# Patient Record
Sex: FEMALE | Race: WHITE | NOT HISPANIC OR LATINO | Employment: OTHER | URBAN - METROPOLITAN AREA
[De-identification: names, ages, dates, MRNs, and addresses within clinical notes are randomized per-mention and may not be internally consistent; named-entity substitution may affect disease eponyms.]

---

## 2023-04-19 ENCOUNTER — OFFICE VISIT (OUTPATIENT)
Dept: PRIMARY CARE | Facility: CLINIC | Age: 68
End: 2023-04-19
Payer: MEDICARE

## 2023-04-19 VITALS
BODY MASS INDEX: 36.54 KG/M2 | HEIGHT: 64 IN | DIASTOLIC BLOOD PRESSURE: 80 MMHG | SYSTOLIC BLOOD PRESSURE: 162 MMHG | WEIGHT: 214 LBS

## 2023-04-19 DIAGNOSIS — R03.0 ELEVATED BLOOD PRESSURE READING IN OFFICE WITHOUT DIAGNOSIS OF HYPERTENSION: ICD-10-CM

## 2023-04-19 DIAGNOSIS — E78.49 OTHER HYPERLIPIDEMIA: Primary | ICD-10-CM

## 2023-04-19 DIAGNOSIS — K21.9 GASTROESOPHAGEAL REFLUX DISEASE WITHOUT ESOPHAGITIS: ICD-10-CM

## 2023-04-19 DIAGNOSIS — R06.09 DOE (DYSPNEA ON EXERTION): ICD-10-CM

## 2023-04-19 PROBLEM — F32.A ANXIETY AND DEPRESSION: Status: ACTIVE | Noted: 2023-04-19

## 2023-04-19 PROBLEM — F41.9 ANXIETY AND DEPRESSION: Status: ACTIVE | Noted: 2023-04-19

## 2023-04-19 PROBLEM — M19.90 ARTHRITIS: Status: ACTIVE | Noted: 2023-04-19

## 2023-04-19 PROCEDURE — 1159F MED LIST DOCD IN RCRD: CPT | Performed by: INTERNAL MEDICINE

## 2023-04-19 PROCEDURE — 99204 OFFICE O/P NEW MOD 45 MIN: CPT | Performed by: INTERNAL MEDICINE

## 2023-04-19 PROCEDURE — 1036F TOBACCO NON-USER: CPT | Performed by: INTERNAL MEDICINE

## 2023-04-19 RX ORDER — HYDROXYZINE PAMOATE 25 MG/1
25 CAPSULE ORAL 3 TIMES DAILY PRN
COMMUNITY
Start: 2023-04-03 | End: 2023-07-07 | Stop reason: ALTCHOICE

## 2023-04-19 RX ORDER — ALBUTEROL SULFATE 90 UG/1
AEROSOL, METERED RESPIRATORY (INHALATION)
COMMUNITY
Start: 2023-01-30

## 2023-04-19 RX ORDER — PANTOPRAZOLE SODIUM 40 MG/1
40 TABLET, DELAYED RELEASE ORAL
Qty: 90 TABLET | Refills: 1 | Status: SHIPPED | OUTPATIENT
Start: 2023-04-19 | End: 2023-05-17 | Stop reason: SDUPTHER

## 2023-04-19 RX ORDER — BUPROPION HCL 300 MG
300 TABLET, EXTENDED RELEASE 24 HR ORAL EVERY 24 HOURS
COMMUNITY

## 2023-04-19 RX ORDER — HYDROXYCHLOROQUINE SULFATE 200 MG/1
200 TABLET, FILM COATED ORAL DAILY
COMMUNITY
Start: 2023-02-15 | End: 2024-03-11 | Stop reason: WASHOUT

## 2023-04-19 RX ORDER — MIRTAZAPINE 15 MG/1
15 TABLET, FILM COATED ORAL NIGHTLY
COMMUNITY
Start: 2023-03-30

## 2023-04-19 RX ORDER — SIMVASTATIN 20 MG/1
20 TABLET, FILM COATED ORAL NIGHTLY
Qty: 90 TABLET | Refills: 1 | Status: SHIPPED | OUTPATIENT
Start: 2023-04-19 | End: 2023-07-07 | Stop reason: SDUPTHER

## 2023-04-19 RX ORDER — PANTOPRAZOLE SODIUM 40 MG/1
40 TABLET, DELAYED RELEASE ORAL
COMMUNITY
End: 2023-04-19 | Stop reason: SDUPTHER

## 2023-04-19 RX ORDER — SIMVASTATIN 20 MG/1
20 TABLET, FILM COATED ORAL NIGHTLY
COMMUNITY
End: 2023-04-19 | Stop reason: SDUPTHER

## 2023-04-19 NOTE — PROGRESS NOTES
"Subjective   Patient ID: Johanny Rosales is a 67 y.o. female who presents for New Patient Visit.    HPI   To est pcp  Patient recently had physical done by the nurse practitioner and had blood work done  She feels very tired and gets short of breath easily  She has a lot of anxiety issues and she is under care of [facility] with new psychiatrist  She used to be on Klonopin which she is not prescribed anymore as it is worked the best for her  She does drink alcohol and moderately high amount.  She was with AA but could not quit drinking  Complaining of numbness in both hands  Needs medication refill  She is on disability for depression and anxiety since 2007  Her MRI of C-spine shows arthritis and C3-4 and mild canal stenosis at C5-6    PMH: anxiety, depression, HLD, urinary incontinence, psoriatic arthritis, osteoarthritis, carpal tunnel syndrome  PSH: cholecystectomy (2017), R shoulder tendon repair (2018)  FH: Mom - dementia, Father - CVA, Sister - breast CA  SocHx: Drinks 2L of wine daily. Denies tobacco use, quit smoking 8 yr, illicit drug use  Allergies: NKDA  Patient has  Voice and 5 grandkids  Review of Systems    Objective   /80   Ht 1.626 m (5' 4\")   Wt 97.1 kg (214 lb)   BMI 36.73 kg/m²     Physical Exam  Vitals reviewed.   Constitutional:       Appearance: Normal appearance.   HENT:      Head: Normocephalic and atraumatic.      Right Ear: Tympanic membrane, ear canal and external ear normal.      Left Ear: Tympanic membrane, ear canal and external ear normal.      Nose: Nose normal.      Mouth/Throat:      Pharynx: Oropharynx is clear.   Eyes:      Extraocular Movements: Extraocular movements intact.      Conjunctiva/sclera: Conjunctivae normal.      Pupils: Pupils are equal, round, and reactive to light.   Cardiovascular:      Rate and Rhythm: Normal rate and regular rhythm.      Pulses: Normal pulses.      Heart sounds: Normal heart sounds.   Pulmonary:      Effort: Pulmonary effort is " normal.      Breath sounds: Normal breath sounds.   Abdominal:      General: Abdomen is flat. Bowel sounds are normal.      Palpations: Abdomen is soft.   Musculoskeletal:      Cervical back: Normal range of motion and neck supple.   Skin:     General: Skin is warm and dry.   Neurological:      General: No focal deficit present.      Mental Status: She is alert and oriented to person, place, and time.   Psychiatric:         Mood and Affect: Mood normal.         Assessment/Plan   Problem List Items Addressed This Visit          Digestive    Gastroesophageal reflux disease without esophagitis    Relevant Medications    Protonix 40 mg EC tablet       Other    Other hyperlipidemia - Primary    Relevant Medications    Zocor 20 mg tablet     Other Visit Diagnoses       SPENCER (dyspnea on exertion)        Relevant Orders    Transthoracic echo (TTE) complete          Patient's previous hospital chart reviewed  Recent blood work from [facility] TSH 2.76, A1c 4.6 hemoglobin 13.5 hematocrit 46.4 WBC 5.9 vitamin D 63 AST 72 ALT 68 alk phos 139 BUN 9 creatinine 1.7.  Total cholesterol 266 triglycerides 95   Patient seems to be stable on current medication  Refill Protonix and Zocor  Her blood pressure is running little high not sure if it is just the first visit causing it  We will check 2D echo with her symptoms of dyspnea  Will monitor blood work and if it is high will start medication

## 2023-05-04 ENCOUNTER — OFFICE VISIT (OUTPATIENT)
Dept: PRIMARY CARE | Facility: CLINIC | Age: 68
End: 2023-05-04
Payer: MEDICARE

## 2023-05-04 VITALS
BODY MASS INDEX: 36.19 KG/M2 | SYSTOLIC BLOOD PRESSURE: 124 MMHG | HEIGHT: 64 IN | WEIGHT: 212 LBS | DIASTOLIC BLOOD PRESSURE: 70 MMHG

## 2023-05-04 DIAGNOSIS — R35.0 URINARY FREQUENCY: ICD-10-CM

## 2023-05-04 DIAGNOSIS — R10.9 ABDOMINAL PAIN, UNSPECIFIED ABDOMINAL LOCATION: Primary | ICD-10-CM

## 2023-05-04 PROBLEM — F14.21 COCAINE USE DISORDER, SEVERE, IN SUSTAINED REMISSION (MULTI): Chronic | Status: ACTIVE | Noted: 2023-01-27

## 2023-05-04 PROBLEM — F33.1 MODERATE EPISODE OF RECURRENT MAJOR DEPRESSIVE DISORDER (MULTI): Chronic | Status: ACTIVE | Noted: 2023-01-27

## 2023-05-04 PROCEDURE — 99213 OFFICE O/P EST LOW 20 MIN: CPT | Performed by: INTERNAL MEDICINE

## 2023-05-04 PROCEDURE — 1159F MED LIST DOCD IN RCRD: CPT | Performed by: INTERNAL MEDICINE

## 2023-05-04 PROCEDURE — 1036F TOBACCO NON-USER: CPT | Performed by: INTERNAL MEDICINE

## 2023-05-04 RX ORDER — TRAZODONE HYDROCHLORIDE 100 MG/1
2 TABLET ORAL NIGHTLY
COMMUNITY
Start: 2011-06-11 | End: 2024-03-11 | Stop reason: WASHOUT

## 2023-05-04 RX ORDER — HYDROXYZINE HYDROCHLORIDE 25 MG/1
25 TABLET, FILM COATED ORAL EVERY 4 HOURS PRN
COMMUNITY
Start: 2011-06-11

## 2023-05-04 RX ORDER — PRAVASTATIN SODIUM 20 MG/1
1 TABLET ORAL NIGHTLY
COMMUNITY
Start: 2011-06-11 | End: 2023-07-07 | Stop reason: ALTCHOICE

## 2023-05-04 RX ORDER — BUSPIRONE HYDROCHLORIDE 15 MG/1
30 TABLET ORAL 2 TIMES DAILY
COMMUNITY
Start: 2011-06-11 | End: 2024-03-11 | Stop reason: WASHOUT

## 2023-05-04 RX ORDER — SERTRALINE HYDROCHLORIDE 100 MG/1
100 TABLET, FILM COATED ORAL 2 TIMES DAILY
COMMUNITY
Start: 2011-06-11

## 2023-05-06 ASSESSMENT — ENCOUNTER SYMPTOMS
ABDOMINAL PAIN: 1
FREQUENCY: 1

## 2023-05-07 NOTE — PROGRESS NOTES
"Subjective   Patient ID: Johanny Rosales is a 67 y.o. female who presents for Urinary Frequency and Abdominal Pain.    Urinary Frequency   Associated symptoms include frequency.   Abdominal Pain  Associated symptoms include frequency.      Patient is here with complaints of having urinary frequency  Lower abdominal pain   Going for carpal tunnel surgery on Tuesday    to [facility] pcp  Patient recently had physical done by the nurse practitioner and had blood work done  She feels very tired and gets short of breath easily  She has a lot of anxiety issues and she is under care of [facility] with new psychiatrist  She used to be on Klonopin which she is not prescribed anymore as it is worked the best for her  She does drink alcohol and moderately high amount.  She was with AA but could not quit drinking  Complaining of numbness in both hands  Needs medication refill  She is on disability for depression and anxiety since 2007  Her MRI of C-spine shows arthritis and C3-4 and mild canal stenosis at C5-6     PMH: anxiety, depression, HLD, urinary incontinence, psoriatic arthritis, osteoarthritis, carpal tunnel syndrome  PSH: cholecystectomy (2017), R shoulder tendon repair (2018)  FH: Mom - dementia, Father - CVA, Sister - breast CA  SocHx: Drinks 2L of wine daily. Denies tobacco use, quit smoking 8 yr, illicit drug use  Allergies: NKDA    Review of Systems   Gastrointestinal:  Positive for abdominal pain.   Genitourinary:  Positive for frequency.       Objective   /70   Ht 1.626 m (5' 4\")   Wt 96.2 kg (212 lb)   BMI 36.39 kg/m²     Physical Exam  Vitals reviewed.   Constitutional:       Appearance: Normal appearance.   HENT:      Head: Normocephalic and atraumatic.      Right Ear: Tympanic membrane, ear canal and external ear normal.      Left Ear: Tympanic membrane, ear canal and external ear normal.      Nose: Nose normal.      Mouth/Throat:      Pharynx: Oropharynx is clear.   Eyes:      Extraocular Movements: " Extraocular movements intact.      Conjunctiva/sclera: Conjunctivae normal.      Pupils: Pupils are equal, round, and reactive to light.   Cardiovascular:      Rate and Rhythm: Normal rate and regular rhythm.      Pulses: Normal pulses.      Heart sounds: Normal heart sounds.   Pulmonary:      Effort: Pulmonary effort is normal.      Breath sounds: Normal breath sounds.   Abdominal:      General: Abdomen is flat. Bowel sounds are normal.      Palpations: Abdomen is soft.   Musculoskeletal:      Cervical back: Normal range of motion and neck supple.   Skin:     General: Skin is warm and dry.   Neurological:      General: No focal deficit present.      Mental Status: She is alert and oriented to person, place, and time.   Psychiatric:         Mood and Affect: Mood normal.         Assessment/Plan   Problem List Items Addressed This Visit    None  Visit Diagnoses       Urinary frequency        Relevant Orders    Urinalysis with Reflex Microscopic    Urine Culture             Past recap  Patient's previous hospital chart reviewed  Recent blood work from [place] reviewed TSH 2.76, A1c 4.6 hemoglobin 13.5 hematocrit 46.4 WBC 5.9 vitamin D 63 AST 72 ALT 68 alk phos 139 BUN 9 creatinine 1.7.  Total cholesterol 266 triglycerides 95   Patient seems to be stable on current medication  Refill Protonix and Zocor  Her blood pressure is running little high not sure if it is just the first visit causing it  We will check 2D echo with her symptoms of dyspnea  Will monitor blood work and if it is high will start medication    5/4/2023  We will check urine and culture  We will do blood work in 1 month CBC CMP to monitor kidney function and elevated liver enzyme  Cut down salt cut down carbs

## 2023-05-17 DIAGNOSIS — K21.9 GASTROESOPHAGEAL REFLUX DISEASE WITHOUT ESOPHAGITIS: ICD-10-CM

## 2023-05-17 RX ORDER — PANTOPRAZOLE SODIUM 40 MG/1
40 TABLET, DELAYED RELEASE ORAL
Qty: 90 TABLET | Refills: 1 | Status: SHIPPED | OUTPATIENT
Start: 2023-05-17 | End: 2023-07-07 | Stop reason: SDUPTHER

## 2023-05-22 ENCOUNTER — LAB (OUTPATIENT)
Dept: LAB | Facility: LAB | Age: 68
End: 2023-05-22
Payer: MEDICARE

## 2023-05-22 DIAGNOSIS — R35.0 URINARY FREQUENCY: ICD-10-CM

## 2023-05-22 LAB
APPEARANCE, URINE: CLEAR
BILIRUBIN, URINE: NEGATIVE
BLOOD, URINE: NEGATIVE
COLOR, URINE: NORMAL
GLUCOSE, URINE: NEGATIVE MG/DL
KETONES, URINE: NEGATIVE MG/DL
LEUKOCYTE ESTERASE, URINE: NEGATIVE
NITRITE, URINE: NEGATIVE
PH, URINE: 5 (ref 5–8)
PROTEIN, URINE: NEGATIVE MG/DL
SPECIFIC GRAVITY, URINE: 1 (ref 1–1.03)
UROBILINOGEN, URINE: <2 MG/DL (ref 0–1.9)

## 2023-05-22 PROCEDURE — 81003 URINALYSIS AUTO W/O SCOPE: CPT

## 2023-06-05 ENCOUNTER — APPOINTMENT (OUTPATIENT)
Dept: PRIMARY CARE | Facility: CLINIC | Age: 68
End: 2023-06-05
Payer: MEDICARE

## 2023-06-06 ENCOUNTER — APPOINTMENT (OUTPATIENT)
Dept: PRIMARY CARE | Facility: CLINIC | Age: 68
End: 2023-06-06
Payer: MEDICARE

## 2023-06-13 ENCOUNTER — APPOINTMENT (OUTPATIENT)
Dept: PRIMARY CARE | Facility: CLINIC | Age: 68
End: 2023-06-13
Payer: MEDICARE

## 2023-06-21 ENCOUNTER — APPOINTMENT (OUTPATIENT)
Dept: PRIMARY CARE | Facility: CLINIC | Age: 68
End: 2023-06-21
Payer: MEDICARE

## 2023-07-07 ENCOUNTER — OFFICE VISIT (OUTPATIENT)
Dept: PRIMARY CARE | Facility: CLINIC | Age: 68
End: 2023-07-07
Payer: MEDICARE

## 2023-07-07 VITALS
DIASTOLIC BLOOD PRESSURE: 64 MMHG | WEIGHT: 215 LBS | BODY MASS INDEX: 36.7 KG/M2 | HEIGHT: 64 IN | SYSTOLIC BLOOD PRESSURE: 120 MMHG

## 2023-07-07 DIAGNOSIS — F32.A ANXIETY AND DEPRESSION: ICD-10-CM

## 2023-07-07 DIAGNOSIS — E78.49 OTHER HYPERLIPIDEMIA: ICD-10-CM

## 2023-07-07 DIAGNOSIS — K21.9 GASTROESOPHAGEAL REFLUX DISEASE WITHOUT ESOPHAGITIS: ICD-10-CM

## 2023-07-07 DIAGNOSIS — F41.9 ANXIETY AND DEPRESSION: ICD-10-CM

## 2023-07-07 PROCEDURE — 1159F MED LIST DOCD IN RCRD: CPT | Performed by: INTERNAL MEDICINE

## 2023-07-07 PROCEDURE — 1036F TOBACCO NON-USER: CPT | Performed by: INTERNAL MEDICINE

## 2023-07-07 PROCEDURE — 99213 OFFICE O/P EST LOW 20 MIN: CPT | Performed by: INTERNAL MEDICINE

## 2023-07-07 RX ORDER — GABAPENTIN 100 MG/1
100 CAPSULE ORAL 3 TIMES DAILY
COMMUNITY
Start: 2023-06-29

## 2023-07-07 RX ORDER — SIMVASTATIN 20 MG/1
20 TABLET, FILM COATED ORAL NIGHTLY
Qty: 90 TABLET | Refills: 1 | Status: SHIPPED | OUTPATIENT
Start: 2023-07-07 | End: 2023-10-12 | Stop reason: SDUPTHER

## 2023-07-07 RX ORDER — PANTOPRAZOLE SODIUM 40 MG/1
40 TABLET, DELAYED RELEASE ORAL 2 TIMES DAILY
Qty: 180 TABLET | Refills: 1 | Status: SHIPPED | OUTPATIENT
Start: 2023-07-07 | End: 2023-10-12 | Stop reason: SDUPTHER

## 2023-07-13 ENCOUNTER — LAB (OUTPATIENT)
Dept: LAB | Facility: LAB | Age: 68
End: 2023-07-13
Payer: MEDICARE

## 2023-07-13 ENCOUNTER — HOSPITAL ENCOUNTER (OUTPATIENT)
Dept: DATA CONVERSION | Facility: HOSPITAL | Age: 68
Discharge: HOME | End: 2023-07-13

## 2023-07-13 DIAGNOSIS — E78.49 OTHER HYPERLIPIDEMIA: ICD-10-CM

## 2023-07-13 DIAGNOSIS — K21.9 GASTROESOPHAGEAL REFLUX DISEASE WITHOUT ESOPHAGITIS: ICD-10-CM

## 2023-07-13 DIAGNOSIS — F41.9 ANXIETY AND DEPRESSION: ICD-10-CM

## 2023-07-13 DIAGNOSIS — F32.A ANXIETY AND DEPRESSION: ICD-10-CM

## 2023-07-13 LAB
ALANINE AMINOTRANSFERASE (SGPT) (U/L) IN SER/PLAS: 73 U/L (ref 7–45)
ALBUMIN (G/DL) IN SER/PLAS: 4.9 G/DL (ref 3.4–5)
ALKALINE PHOSPHATASE (U/L) IN SER/PLAS: 111 U/L (ref 33–136)
ANION GAP IN SER/PLAS: 17 MMOL/L (ref 10–20)
ASPARTATE AMINOTRANSFERASE (SGOT) (U/L) IN SER/PLAS: 66 U/L (ref 9–39)
BILIRUBIN TOTAL (MG/DL) IN SER/PLAS: 0.7 MG/DL (ref 0–1.2)
CALCIUM (MG/DL) IN SER/PLAS: 9.9 MG/DL (ref 8.6–10.6)
CARBON DIOXIDE, TOTAL (MMOL/L) IN SER/PLAS: 29 MMOL/L (ref 21–32)
CHLORIDE (MMOL/L) IN SER/PLAS: 99 MMOL/L (ref 98–107)
CHOLESTEROL (MG/DL) IN SER/PLAS: 265 MG/DL (ref 0–199)
CHOLESTEROL IN HDL (MG/DL) IN SER/PLAS: 133.9 MG/DL
CHOLESTEROL/HDL RATIO: 2
CREATININE (MG/DL) IN SER/PLAS: 0.63 MG/DL (ref 0.5–1.05)
ERYTHROCYTE DISTRIBUTION WIDTH (RATIO) BY AUTOMATED COUNT: 12.6 % (ref 11.5–14.5)
ERYTHROCYTE MEAN CORPUSCULAR HEMOGLOBIN CONCENTRATION (G/DL) BY AUTOMATED: 32.5 G/DL (ref 32–36)
ERYTHROCYTE MEAN CORPUSCULAR VOLUME (FL) BY AUTOMATED COUNT: 94 FL (ref 80–100)
ERYTHROCYTES (10*6/UL) IN BLOOD BY AUTOMATED COUNT: 4.24 X10E12/L (ref 4–5.2)
GFR FEMALE: >90 ML/MIN/1.73M2
GLUCOSE (MG/DL) IN SER/PLAS: 100 MG/DL (ref 74–99)
HEMATOCRIT (%) IN BLOOD BY AUTOMATED COUNT: 40 % (ref 36–46)
HEMOGLOBIN (G/DL) IN BLOOD: 13 G/DL (ref 12–16)
LDL: 113 MG/DL (ref 0–99)
LEUKOCYTES (10*3/UL) IN BLOOD BY AUTOMATED COUNT: 5.7 X10E9/L (ref 4.4–11.3)
NRBC (PER 100 WBCS) BY AUTOMATED COUNT: 0 /100 WBC (ref 0–0)
PLATELETS (10*3/UL) IN BLOOD AUTOMATED COUNT: 270 X10E9/L (ref 150–450)
POTASSIUM (MMOL/L) IN SER/PLAS: 4.6 MMOL/L (ref 3.5–5.3)
PROTEIN TOTAL: 7.6 G/DL (ref 6.4–8.2)
SODIUM (MMOL/L) IN SER/PLAS: 140 MMOL/L (ref 136–145)
THYROTROPIN (MIU/L) IN SER/PLAS BY DETECTION LIMIT <= 0.05 MIU/L: 2.33 MIU/L (ref 0.44–3.98)
TRIGLYCERIDE (MG/DL) IN SER/PLAS: 90 MG/DL (ref 0–149)
UREA NITROGEN (MG/DL) IN SER/PLAS: 9 MG/DL (ref 6–23)
VLDL: 18 MG/DL (ref 0–40)

## 2023-07-13 PROCEDURE — 84443 ASSAY THYROID STIM HORMONE: CPT

## 2023-07-13 PROCEDURE — 85027 COMPLETE CBC AUTOMATED: CPT

## 2023-07-13 PROCEDURE — 80053 COMPREHEN METABOLIC PANEL: CPT

## 2023-07-13 PROCEDURE — 80061 LIPID PANEL: CPT

## 2023-07-13 PROCEDURE — 36415 COLL VENOUS BLD VENIPUNCTURE: CPT

## 2023-08-10 ENCOUNTER — HOSPITAL ENCOUNTER (OUTPATIENT)
Dept: DATA CONVERSION | Facility: HOSPITAL | Age: 68
End: 2023-08-10

## 2023-08-23 ENCOUNTER — TELEPHONE (OUTPATIENT)
Dept: PRIMARY CARE | Facility: CLINIC | Age: 68
End: 2023-08-23
Payer: MEDICARE

## 2023-08-24 DIAGNOSIS — M19.90 ARTHRITIS: ICD-10-CM

## 2023-09-06 ENCOUNTER — HOSPITAL ENCOUNTER (OUTPATIENT)
Dept: DATA CONVERSION | Facility: HOSPITAL | Age: 68
End: 2023-09-06

## 2023-10-12 ENCOUNTER — OFFICE VISIT (OUTPATIENT)
Dept: PRIMARY CARE | Facility: CLINIC | Age: 68
End: 2023-10-12
Payer: MEDICARE

## 2023-10-12 VITALS
BODY MASS INDEX: 37.73 KG/M2 | HEIGHT: 64 IN | WEIGHT: 221 LBS | DIASTOLIC BLOOD PRESSURE: 60 MMHG | SYSTOLIC BLOOD PRESSURE: 132 MMHG

## 2023-10-12 DIAGNOSIS — R79.89 ELEVATED LFTS: Primary | ICD-10-CM

## 2023-10-12 DIAGNOSIS — N39.498 OTHER URINARY INCONTINENCE: ICD-10-CM

## 2023-10-12 DIAGNOSIS — K21.9 GASTROESOPHAGEAL REFLUX DISEASE WITHOUT ESOPHAGITIS: ICD-10-CM

## 2023-10-12 DIAGNOSIS — E78.49 OTHER HYPERLIPIDEMIA: ICD-10-CM

## 2023-10-12 PROCEDURE — 3008F BODY MASS INDEX DOCD: CPT | Performed by: INTERNAL MEDICINE

## 2023-10-12 PROCEDURE — 99214 OFFICE O/P EST MOD 30 MIN: CPT | Performed by: INTERNAL MEDICINE

## 2023-10-12 PROCEDURE — 1159F MED LIST DOCD IN RCRD: CPT | Performed by: INTERNAL MEDICINE

## 2023-10-12 PROCEDURE — 1036F TOBACCO NON-USER: CPT | Performed by: INTERNAL MEDICINE

## 2023-10-12 RX ORDER — PANTOPRAZOLE SODIUM 40 MG/1
40 TABLET, DELAYED RELEASE ORAL 2 TIMES DAILY
Qty: 180 TABLET | Refills: 1 | Status: SHIPPED | OUTPATIENT
Start: 2023-10-12 | End: 2024-03-11 | Stop reason: SDUPTHER

## 2023-10-12 RX ORDER — SIMVASTATIN 20 MG/1
20 TABLET, FILM COATED ORAL NIGHTLY
Qty: 90 TABLET | Refills: 1 | Status: SHIPPED
Start: 2023-10-12 | End: 2023-10-19

## 2023-10-12 ASSESSMENT — ENCOUNTER SYMPTOMS
LOSS OF SENSATION IN FEET: 0
OCCASIONAL FEELINGS OF UNSTEADINESS: 0
DEPRESSION: 0

## 2023-10-12 NOTE — PROGRESS NOTES
"Subjective   Patient ID: Johanny Rosales is a 68 y.o. female who presents for Follow-up and Med Refill.    Med Refill    Patient is here for follow-up  She had left hand carpal tunnel surgery now she is having trigger finger in the ring finger and right hand is also having trigger fingers  She is still drinking  She wants diapers for urinary incontinence.  She states she has urine incontinence but she has never been evaluated for it she has not seen a urologist for it   Follow-up on high cholesterol ,GERD, anxiety      patient is here for follow-up  Did blood work  Gabapentin started by psychiatrist  Right hand carpal tunnel surgery done left hand tendinitis and trigger finger done  Follow-up on high cholesterol GERD and depression  Complaining of sinus congestion \\    Going for carpal tunnel surgery on Tuesday     to est pcp  Patient recently had physical done by the nurse practitioner and had blood work done  She feels very tired and gets short of breath easily  She has a lot of anxiety issues and she is under care of [facility] with new psychiatrist  She used to be on Klonopin which she is not prescribed anymore as it is worked the best for her  She does drink alcohol and moderately high amount.  She was with AA but could not quit drinking  Complaining of numbness in both hands  Needs medication refill  She is on disability for depression and anxiety since 2007  Her MRI of C-spine shows arthritis and C3-4 and mild canal stenosis at C5-6     PMH: anxiety, depression, HLD, urinary incontinence, psoriatic arthritis, osteoarthritis, carpal tunnel syndrome  PSH: cholecystectomy (2017), R shoulder tendon repair (2018)  FH: Mom - dementia, Father - CVA, Sister - breast CA  SocHx: Drinks 2L of wine daily. Denies tobacco use, quit smoking 8 yr, illicit drug use  Allergies: NKDA       Review of Systems    Objective   /60   Ht 1.626 m (5' 4\")   Wt 100 kg (221 lb)   BMI 37.93 kg/m²     Physical Exam  Vitals " reviewed.   Constitutional:       Appearance: Normal appearance.   HENT:      Head: Normocephalic and atraumatic.      Right Ear: Tympanic membrane, ear canal and external ear normal.      Left Ear: Tympanic membrane, ear canal and external ear normal.      Nose: Nose normal.      Mouth/Throat:      Pharynx: Oropharynx is clear.   Eyes:      Extraocular Movements: Extraocular movements intact.      Conjunctiva/sclera: Conjunctivae normal.      Pupils: Pupils are equal, round, and reactive to light.   Cardiovascular:      Rate and Rhythm: Normal rate and regular rhythm.      Pulses: Normal pulses.      Heart sounds: Normal heart sounds.   Pulmonary:      Effort: Pulmonary effort is normal.      Breath sounds: Normal breath sounds.   Abdominal:      General: Abdomen is flat. Bowel sounds are normal.      Palpations: Abdomen is soft.   Musculoskeletal:      Cervical back: Normal range of motion and neck supple.   Skin:     General: Skin is warm and dry.   Neurological:      General: No focal deficit present.      Mental Status: She is alert and oriented to person, place, and time.   Psychiatric:         Mood and Affect: Mood normal.         Assessment/Plan   Problem List Items Addressed This Visit          Cardiac and Vasculature    Other hyperlipidemia    Relevant Orders    CBC    Comprehensive Metabolic Panel    Lipid Panel    TSH with reflex to Free T4 if abnormal    Vitamin D 25-Hydroxy,Total (for eval of Vitamin D levels)       Gastrointestinal and Abdominal    Gastroesophageal reflux disease without esophagitis    Relevant Medications    pantoprazole (Protonix) 40 mg EC tablet    Other Relevant Orders    CBC    Comprehensive Metabolic Panel    Lipid Panel     Other Visit Diagnoses       Elevated LFTs    -  Primary    Relevant Orders    CBC    Comprehensive Metabolic Panel    Lipid Panel    Vitamin B12    Body mass index (BMI) 37.0-37.9, adult        Relevant Orders    Vitamin D 25-Hydroxy,Total (for eval of  Vitamin D levels)    Other urinary incontinence        Relevant Orders    Referral to Urology          Past recap  Patient's previous hospital chart reviewed  Recent blood work from [place] reviewed TSH 2.76, A1c 4.6 hemoglobin 13.5 hematocrit 46.4 WBC 5.9 vitamin D 63 AST 72 ALT 68 alk phos 139 BUN 9 creatinine 1.7.  Total cholesterol 266 triglycerides 95   Patient seems to be stable on current medication  Refill Protonix and Zocor  Her blood pressure is running little high not sure if it is just the first visit causing it  We will check 2D echo with her symptoms of dyspnea  Will monitor blood work and if it is high will start medication     5/4/2023  We will check urine and culture  We will do blood work in 1 month CBC CMP to monitor kidney function and elevated liver enzyme  Cut down salt cut down carbs    7/8/2023  Blood work ordered  Medications refilled  Use Flonase and Claritin for sinus congestion  Routine follow-up in 3 months  Stable on current medications      10/12/2023  Blood work reviewed  Liver enzymes elevated  Encourage patient to cut down drinking  Will refer to her urologist for urinary incontinence  Cholesterol blood work ordered  Medications refilled  Discussed hand exercises refer to orthopedic for trigger fingers if not better

## 2023-10-19 DIAGNOSIS — E78.49 OTHER HYPERLIPIDEMIA: ICD-10-CM

## 2023-10-19 PROBLEM — F41.9 ANXIETY: Status: ACTIVE | Noted: 2023-10-19

## 2023-10-19 PROBLEM — H26.9 CATARACT: Status: ACTIVE | Noted: 2023-10-19

## 2023-10-19 PROBLEM — H25.12 AGE-RELATED NUCLEAR CATARACT OF LEFT EYE: Status: ACTIVE | Noted: 2023-10-19

## 2023-10-19 PROBLEM — K81.2 ACUTE ON CHRONIC CHOLECYSTITIS: Status: ACTIVE | Noted: 2023-10-19

## 2023-10-19 PROBLEM — R41.82 ACUTE ALTERATION IN MENTAL STATUS: Status: ACTIVE | Noted: 2023-10-19

## 2023-10-19 PROBLEM — F31.9 BIPOLAR 1 DISORDER (MULTI): Status: ACTIVE | Noted: 2023-10-19

## 2023-10-19 PROBLEM — R26.9 ABNORMAL GAIT: Status: ACTIVE | Noted: 2023-10-19

## 2023-10-19 PROBLEM — G56.00 CARPAL TUNNEL SYNDROME: Status: ACTIVE | Noted: 2023-10-19

## 2023-10-19 PROBLEM — N95.2 ATROPHIC VAGINITIS: Status: ACTIVE | Noted: 2023-10-19

## 2023-10-19 PROBLEM — R07.9 CHEST PAIN: Status: ACTIVE | Noted: 2023-10-19

## 2023-10-19 PROBLEM — Z96.1 ARTIFICIAL LENS PRESENT: Status: ACTIVE | Noted: 2023-10-19

## 2023-10-19 RX ORDER — TRAMADOL HYDROCHLORIDE 50 MG/1
TABLET ORAL
COMMUNITY
Start: 2023-10-17 | End: 2024-03-11 | Stop reason: WASHOUT

## 2023-10-19 RX ORDER — SIMVASTATIN 20 MG/1
20 TABLET, FILM COATED ORAL NIGHTLY
Qty: 90 TABLET | Refills: 1 | Status: SHIPPED | OUTPATIENT
Start: 2023-10-19 | End: 2024-03-11 | Stop reason: SDUPTHER

## 2023-10-24 ENCOUNTER — APPOINTMENT (OUTPATIENT)
Dept: OBSTETRICS AND GYNECOLOGY | Facility: CLINIC | Age: 68
End: 2023-10-24
Payer: MEDICARE

## 2023-11-20 ENCOUNTER — OFFICE VISIT (OUTPATIENT)
Dept: UROLOGY | Facility: CLINIC | Age: 68
End: 2023-11-20
Payer: MEDICARE

## 2023-11-20 VITALS — BODY MASS INDEX: 37.73 KG/M2 | HEIGHT: 64 IN | WEIGHT: 221 LBS

## 2023-11-20 DIAGNOSIS — N39.498 OTHER URINARY INCONTINENCE: ICD-10-CM

## 2023-11-20 PROCEDURE — 1159F MED LIST DOCD IN RCRD: CPT | Performed by: NURSE PRACTITIONER

## 2023-11-20 PROCEDURE — 1126F AMNT PAIN NOTED NONE PRSNT: CPT | Performed by: NURSE PRACTITIONER

## 2023-11-20 PROCEDURE — 99203 OFFICE O/P NEW LOW 30 MIN: CPT | Performed by: NURSE PRACTITIONER

## 2023-11-20 PROCEDURE — 51798 US URINE CAPACITY MEASURE: CPT | Performed by: NURSE PRACTITIONER

## 2023-11-20 PROCEDURE — 3008F BODY MASS INDEX DOCD: CPT | Performed by: NURSE PRACTITIONER

## 2023-11-20 PROCEDURE — 99213 OFFICE O/P EST LOW 20 MIN: CPT | Performed by: NURSE PRACTITIONER

## 2023-11-20 PROCEDURE — 1036F TOBACCO NON-USER: CPT | Performed by: NURSE PRACTITIONER

## 2023-11-20 ASSESSMENT — PATIENT HEALTH QUESTIONNAIRE - PHQ9
SUM OF ALL RESPONSES TO PHQ9 QUESTIONS 1 AND 2: 0
10. IF YOU CHECKED OFF ANY PROBLEMS, HOW DIFFICULT HAVE THESE PROBLEMS MADE IT FOR YOU TO DO YOUR WORK, TAKE CARE OF THINGS AT HOME, OR GET ALONG WITH OTHER PEOPLE: VERY DIFFICULT
2. FEELING DOWN, DEPRESSED OR HOPELESS: SEVERAL DAYS
2. FEELING DOWN, DEPRESSED OR HOPELESS: NOT AT ALL
1. LITTLE INTEREST OR PLEASURE IN DOING THINGS: SEVERAL DAYS
1. LITTLE INTEREST OR PLEASURE IN DOING THINGS: NOT AT ALL
SUM OF ALL RESPONSES TO PHQ9 QUESTIONS 1 AND 2: 2

## 2023-11-20 ASSESSMENT — ENCOUNTER SYMPTOMS
DEPRESSION: 1
OCCASIONAL FEELINGS OF UNSTEADINESS: 0
LOSS OF SENSATION IN FEET: 0

## 2023-11-20 ASSESSMENT — COLUMBIA-SUICIDE SEVERITY RATING SCALE - C-SSRS
2. HAVE YOU ACTUALLY HAD ANY THOUGHTS OF KILLING YOURSELF?: NO
6. HAVE YOU EVER DONE ANYTHING, STARTED TO DO ANYTHING, OR PREPARED TO DO ANYTHING TO END YOUR LIFE?: NO
1. IN THE PAST MONTH, HAVE YOU WISHED YOU WERE DEAD OR WISHED YOU COULD GO TO SLEEP AND NOT WAKE UP?: NO

## 2023-11-20 ASSESSMENT — PAIN SCALES - GENERAL: PAINLEVEL: 0-NO PAIN

## 2023-11-20 NOTE — PROGRESS NOTES
[facility]  Outpatient Clinic Note      Patient: Johanny Rosales  Age/Sex: 68 y.o., female  MRN: [id number]      History of Present Illness  68-year-old female presents as new patient for management of urinary incontinence, kindly referred by Dr. Francisco. She has a history of anxiety, depression, HLD and alcoholism. Reports a 10-year history of incontinence, managed with briefs. She has tried oral medications in the past with side effects. She is not interested in medical or surgical therapies for symptoms. She denies any dysuria, gross hematuria, flank pain, fevers or chills.     Past Medical & Surgical History  Past Medical History:   Diagnosis Date    Anxiety     Arthritis     Depression     GERD (gastroesophageal reflux disease)       Past Surgical History:   Procedure Laterality Date    CHOLECYSTECTOMY            Labs  Component      Latest Ref Rng 5/22/2023   Color, Urine      STRAW,YELLOW  STRAW    Appearance, Urine      CLEAR  CLEAR    Specific Gravity, Urine      1.005 - 1.035  1.005    pH, Urine      5.0 - 8.0  5.0    Protein, Urine      NEGATIVE mg/dL NEGATIVE    Glucose, Urine      NEGATIVE mg/dL NEGATIVE    Blood, Urine      NEGATIVE  NEGATIVE    Ketones, Urine      NEGATIVE mg/dL NEGATIVE    Bilirubin, Urine      NEGATIVE  NEGATIVE    Urobilinogen, Urine      0.0 - 1.9 mg/dL <2.0    Nitrite, Urine      NEGATIVE  NEGATIVE    Leukocyte Esterase, Urine      NEGATIVE  NEGATIVE          Medications:  Current Outpatient Medications on File Prior to Visit   Medication Sig Dispense Refill    albuterol 90 mcg/actuation inhaler       busPIRone (Buspar) 15 mg tablet Take 2 tablets (30 mg) by mouth twice a day.      gabapentin (Neurontin) 100 mg capsule Take 1 capsule (100 mg) by mouth 3 times a day.      hydroxychloroquine (Plaquenil) 200 mg tablet Take 1 tablet (200 mg) by mouth once daily.      hydrOXYzine HCL (Atarax) 25 mg tablet Take 1 tablet (25 mg) by mouth every 4 hours if needed.      mirtazapine  (Remeron) 15 mg tablet Take 1 tablet (15 mg) by mouth once daily at bedtime.      pantoprazole (Protonix) 40 mg EC tablet Take 1 tablet (40 mg) by mouth 2 times a day. 180 tablet 1    sertraline (Zoloft) 100 mg tablet Take 1 tablet (100 mg) by mouth twice a day.      simvastatin (Zocor) 20 mg tablet Take 1 tablet (20 mg) by mouth once daily at bedtime. 90 tablet 1    traMADol (Ultram) 50 mg tablet       traZODone (Desyrel) 100 mg tablet Take 2 tablets (200 mg) by mouth once daily at bedtime.      Wellbutrin  mg 24 hr tablet Take 1 tablet (300 mg) by mouth once every 24 hours.       No current facility-administered medications on file prior to visit.          Physical Exam                                                                                                                      General: Well developed, well nourished, alert and cooperative, appears in no acute distress  Eyes: Non-injected conjunctiva, sclera clear, no proptosis  Cardiac: Extremities are warm and well perfused. No edema, cyanosis or pallor.   Lungs: Breathing is easy, non-labored. Speaking in clear and complete sentences. Normal diaphragmatic movement.  MSK: Ambulatory with steady gait, unassisted  Neuro: alert and oriented to person, place and time  Psych: Demonstrates good judgement and reason, without hallucinations, abnormal affect or abnormal behaviors.  Skin: no obvious lesions, no rashes      Review of Systems  Review of Systems   All other systems reviewed and are negative.         Imaging  NA      Assessment & Plan  68-year-old female presents as new patient for management of urinary incontinence, kindly referred by Dr. Francisco. She has a history of anxiety, depression, HLD and alcoholism. Reports a 10-year history of incontinence, managed with briefs. She has tried oral medications in the past with side effects. She is not interested in medical or surgical therapies for symptoms. She denies any dysuria, gross hematuria, flank  pain, fevers or chills. PREVOID bladder scan 112cc, unable to give urine for sample.    We discussed her symptoms in great detail. She would benefit from decreasing ETOH intake.     Today we discussed medication therapy to help alleviate symptoms secondary to OAB. We discussed in detail that first line treatment for OAB is behavioral therapy and initiating a medication does not replace behavioral therapy, but should work in conjunction with one another. The mechanism of action as well as the risks, benefits, common side effects, and alternatives to all prescribed medications were discussed with the patient at length. The patient had the opportunity to ask questions and all questions were answered. I primarily discussed the use of anticholinergic and Beta 3 agonists. Additionally, we discussed the possible need for minimally invasive treatment including  PTNS, Botox or Interstim.    She is not interested in medical therapies.    She will find supply company to supply her briefs and I will sign the order. This can also be managed by PCP.    Follow-up 1 year, or sooner if needed.    Reviewed and approved by EVELYN HANNAH on 11/20/23 at 9:53 AM.   No

## 2023-11-22 DIAGNOSIS — N39.41 URGE INCONTINENCE OF URINE: Primary | ICD-10-CM

## 2023-12-04 PROBLEM — M19.90 INFLAMMATORY ARTHRITIS: Status: ACTIVE | Noted: 2023-12-04

## 2023-12-04 PROBLEM — R61 HYPERHIDROSIS: Status: ACTIVE | Noted: 2023-12-04

## 2023-12-04 PROBLEM — R29.6 RECURRENT FALLS: Status: ACTIVE | Noted: 2023-12-04

## 2023-12-04 PROBLEM — H53.2 DIPLOPIA: Status: ACTIVE | Noted: 2023-12-04

## 2023-12-04 PROBLEM — E55.9 VITAMIN D DEFICIENCY: Status: ACTIVE | Noted: 2023-12-04

## 2023-12-04 PROBLEM — K21.9 GERD (GASTROESOPHAGEAL REFLUX DISEASE): Status: ACTIVE | Noted: 2023-12-04

## 2023-12-04 PROBLEM — H50.111 EXOTROPIA OF RIGHT EYE: Status: ACTIVE | Noted: 2023-12-04

## 2023-12-04 PROBLEM — N31.8 FREQUENCY-URGENCY SYNDROME: Status: ACTIVE | Noted: 2023-12-04

## 2023-12-04 PROBLEM — F32.A DEPRESSION: Status: ACTIVE | Noted: 2023-12-04

## 2023-12-04 PROBLEM — E66.9 OBESITY (BMI 35.0-39.9 WITHOUT COMORBIDITY): Status: ACTIVE | Noted: 2023-12-04

## 2023-12-04 PROBLEM — E78.2 MIXED HYPERLIPIDEMIA: Status: ACTIVE | Noted: 2023-12-04

## 2023-12-04 PROBLEM — G89.4 CHRONIC PAIN SYNDROME: Status: ACTIVE | Noted: 2023-12-04

## 2023-12-04 PROBLEM — R32 INCONTINENCE OF URINE IN FEMALE: Status: ACTIVE | Noted: 2023-12-04

## 2023-12-04 PROBLEM — E66.01 MORBID (SEVERE) OBESITY DUE TO EXCESS CALORIES (MULTI): Status: ACTIVE | Noted: 2023-12-04

## 2023-12-04 PROBLEM — F41.1 GENERALIZED ANXIETY DISORDER: Status: ACTIVE | Noted: 2023-01-27

## 2023-12-04 PROBLEM — F10.10 ALCOHOL ABUSE: Status: ACTIVE | Noted: 2023-12-04

## 2023-12-04 PROBLEM — R26.89 IMPAIRMENT OF BALANCE: Status: ACTIVE | Noted: 2023-12-04

## 2023-12-04 PROBLEM — I50.9 HEART FAILURE (MULTI): Status: ACTIVE | Noted: 2023-12-04

## 2023-12-04 PROBLEM — H93.19 TINNITUS: Status: ACTIVE | Noted: 2023-12-04

## 2023-12-04 PROBLEM — E78.5 HYPERLIPIDEMIA: Status: ACTIVE | Noted: 2023-12-04

## 2023-12-04 PROBLEM — N39.41 URGENCY INCONTINENCE: Status: ACTIVE | Noted: 2023-12-04

## 2024-02-06 ENCOUNTER — APPOINTMENT (OUTPATIENT)
Dept: PRIMARY CARE | Facility: CLINIC | Age: 69
End: 2024-02-06
Payer: MEDICARE

## 2024-02-21 ENCOUNTER — TELEPHONE (OUTPATIENT)
Dept: PRIMARY CARE | Facility: CLINIC | Age: 69
End: 2024-02-21
Payer: MEDICARE

## 2024-02-22 ENCOUNTER — TELEPHONE (OUTPATIENT)
Dept: PRIMARY CARE | Facility: CLINIC | Age: 69
End: 2024-02-22
Payer: MEDICARE

## 2024-03-11 ENCOUNTER — OFFICE VISIT (OUTPATIENT)
Dept: PRIMARY CARE | Facility: CLINIC | Age: 69
End: 2024-03-11
Payer: MEDICARE

## 2024-03-11 VITALS
WEIGHT: 216.8 LBS | BODY MASS INDEX: 37.01 KG/M2 | DIASTOLIC BLOOD PRESSURE: 80 MMHG | HEIGHT: 64 IN | RESPIRATION RATE: 18 BRPM | TEMPERATURE: 96.1 F | OXYGEN SATURATION: 94 % | HEART RATE: 81 BPM | SYSTOLIC BLOOD PRESSURE: 130 MMHG

## 2024-03-11 DIAGNOSIS — E55.9 VITAMIN D DEFICIENCY: ICD-10-CM

## 2024-03-11 DIAGNOSIS — R06.09 DYSPNEA ON EXERTION: ICD-10-CM

## 2024-03-11 DIAGNOSIS — Z00.00 ANNUAL PHYSICAL EXAM: Primary | ICD-10-CM

## 2024-03-11 DIAGNOSIS — Z12.31 ENCOUNTER FOR SCREENING MAMMOGRAM FOR MALIGNANT NEOPLASM OF BREAST: ICD-10-CM

## 2024-03-11 DIAGNOSIS — Z12.11 SCREENING FOR COLON CANCER: ICD-10-CM

## 2024-03-11 DIAGNOSIS — E78.49 OTHER HYPERLIPIDEMIA: ICD-10-CM

## 2024-03-11 DIAGNOSIS — I50.32 CHRONIC DIASTOLIC HEART FAILURE (MULTI): ICD-10-CM

## 2024-03-11 DIAGNOSIS — K21.9 GASTROESOPHAGEAL REFLUX DISEASE WITHOUT ESOPHAGITIS: ICD-10-CM

## 2024-03-11 PROCEDURE — 1036F TOBACCO NON-USER: CPT | Performed by: FAMILY MEDICINE

## 2024-03-11 PROCEDURE — 1160F RVW MEDS BY RX/DR IN RCRD: CPT | Performed by: FAMILY MEDICINE

## 2024-03-11 PROCEDURE — 1170F FXNL STATUS ASSESSED: CPT | Performed by: FAMILY MEDICINE

## 2024-03-11 PROCEDURE — G0439 PPPS, SUBSEQ VISIT: HCPCS | Performed by: FAMILY MEDICINE

## 2024-03-11 PROCEDURE — 1126F AMNT PAIN NOTED NONE PRSNT: CPT | Performed by: FAMILY MEDICINE

## 2024-03-11 PROCEDURE — 99213 OFFICE O/P EST LOW 20 MIN: CPT | Performed by: FAMILY MEDICINE

## 2024-03-11 PROCEDURE — 99215 OFFICE O/P EST HI 40 MIN: CPT | Performed by: FAMILY MEDICINE

## 2024-03-11 PROCEDURE — 1159F MED LIST DOCD IN RCRD: CPT | Performed by: FAMILY MEDICINE

## 2024-03-11 PROCEDURE — 1124F ACP DISCUSS-NO DSCNMKR DOCD: CPT | Performed by: FAMILY MEDICINE

## 2024-03-11 PROCEDURE — 99497 ADVNCD CARE PLAN 30 MIN: CPT | Performed by: FAMILY MEDICINE

## 2024-03-11 PROCEDURE — 3008F BODY MASS INDEX DOCD: CPT | Performed by: FAMILY MEDICINE

## 2024-03-11 RX ORDER — PANTOPRAZOLE SODIUM 40 MG/1
40 TABLET, DELAYED RELEASE ORAL
Qty: 90 TABLET | Refills: 1 | Status: SHIPPED | OUTPATIENT
Start: 2024-03-11 | End: 2024-05-30 | Stop reason: SDUPTHER

## 2024-03-11 RX ORDER — SIMVASTATIN 20 MG/1
20 TABLET, FILM COATED ORAL NIGHTLY
Qty: 90 TABLET | Refills: 1 | Status: SHIPPED | OUTPATIENT
Start: 2024-03-11 | End: 2025-03-11

## 2024-03-11 ASSESSMENT — LIFESTYLE VARIABLES
HOW OFTEN DO YOU HAVE SIX OR MORE DRINKS ON ONE OCCASION: NEVER
HOW MANY STANDARD DRINKS CONTAINING ALCOHOL DO YOU HAVE ON A TYPICAL DAY: 1 OR 2
HOW OFTEN DO YOU HAVE A DRINK CONTAINING ALCOHOL: 4 OR MORE TIMES A WEEK
AUDIT-C TOTAL SCORE: 4
SKIP TO QUESTIONS 9-10: 1

## 2024-03-11 ASSESSMENT — PATIENT HEALTH QUESTIONNAIRE - PHQ9
SUM OF ALL RESPONSES TO PHQ QUESTIONS 1-9: 15
10. IF YOU CHECKED OFF ANY PROBLEMS, HOW DIFFICULT HAVE THESE PROBLEMS MADE IT FOR YOU TO DO YOUR WORK, TAKE CARE OF THINGS AT HOME, OR GET ALONG WITH OTHER PEOPLE: SOMEWHAT DIFFICULT
7. TROUBLE CONCENTRATING ON THINGS, SUCH AS READING THE NEWSPAPER OR WATCHING TELEVISION: NOT AT ALL
8. MOVING OR SPEAKING SO SLOWLY THAT OTHER PEOPLE COULD HAVE NOTICED. OR THE OPPOSITE, BEING SO FIGETY OR RESTLESS THAT YOU HAVE BEEN MOVING AROUND A LOT MORE THAN USUAL: NOT AT ALL
2. FEELING DOWN, DEPRESSED OR HOPELESS: NEARLY EVERY DAY
4. FEELING TIRED OR HAVING LITTLE ENERGY: NEARLY EVERY DAY
6. FEELING BAD ABOUT YOURSELF - OR THAT YOU ARE A FAILURE OR HAVE LET YOURSELF OR YOUR FAMILY DOWN: NOT AT ALL
5. POOR APPETITE OR OVEREATING: NOT AT ALL
SUM OF ALL RESPONSES TO PHQ9 QUESTIONS 1 AND 2: 6
9. THOUGHTS THAT YOU WOULD BE BETTER OFF DEAD, OR OF HURTING YOURSELF: NEARLY EVERY DAY
3. TROUBLE FALLING OR STAYING ASLEEP OR SLEEPING TOO MUCH: NEARLY EVERY DAY
1. LITTLE INTEREST OR PLEASURE IN DOING THINGS: NEARLY EVERY DAY

## 2024-03-11 ASSESSMENT — ACTIVITIES OF DAILY LIVING (ADL)
DRESSING: INDEPENDENT
BATHING: INDEPENDENT

## 2024-03-11 ASSESSMENT — PAIN SCALES - GENERAL: PAINLEVEL: 0-NO PAIN

## 2024-03-11 NOTE — PROGRESS NOTES
"History Of Present Illness  Johanny Rosales is a 68 y.o. female presenting for a wellness exam.    HPI     Feels \"winded\" these past few months. Occasionally has wheezing. No cough, swelling, orthopnea. No smoking or vaping currently. Smoking hx socially for 15 years, but quit 20 years ago. Got a new short hair cat 6 months ago; but prior to that had a long hair cat for 18 years. Has some seasonal allergies and takes OTC antihistamine.  TTE done 2023 showed diastolic dysfunction and atrial septal aneurysm. CT heart calcium score in 2021 with score of 0 but did note emphysematous changes.    Sees Dr Olson at [facility] for mental health.    GERD controlled with pantoprazole BID.       General health: Good  Exercise: not active.  Caffeine: Limited   Diet: Balanced    Medications and current supplements were reviewed and recorded.   Does the patient use opiate medications:  no . If yes, do they take medication appropriately: n/a.    Other providers: Reviewed and recorded   Advanced care planning: Discussed. Lives alone. Sister and kids nearby. Wants sister contact first but son Willem is HCPOA.    Home Safety:   -Up & Go test > 30 seconds?  No  -Home have rugs; lack grab bars in bathroom; lack handrail on stairs; have poor lighting?  No  -Hearing difficulties?  No    Geriatric Assessment  -ADL areas requiring assistance:  Does not need help with Dressing, Eating, Ambulating, Toileting, Grooming, Hygiene.   -IADL areas requiring assistance:  Does not need help with Shopping, Housework, Accounting, Transportation, Driving.   -Functional Ability: No cognitive impairment observed or reported by patient or family    Past Medical History  Patient Active Problem List    Diagnosis Date Noted    Chronic pain syndrome 12/04/2023    Diplopia 12/04/2023    Exotropia of right eye 12/04/2023    Frequency-urgency syndrome 12/04/2023    Heart failure (CMS/HCC) 12/04/2023    Hyperhidrosis 12/04/2023    Impairment of balance " 12/04/2023    Incontinence of urine in female 12/04/2023    Morbid (severe) obesity due to excess calories (CMS/[facility]) 12/04/2023    Obesity (BMI 35.0-39.9 without comorbidity) 12/04/2023    Recurrent falls 12/04/2023    Tinnitus 12/04/2023    Urgency incontinence 12/04/2023    Vitamin D deficiency 12/04/2023    Depression 12/04/2023    Inflammatory arthritis 12/04/2023    Alcohol abuse 12/04/2023    GERD (gastroesophageal reflux disease) 12/04/2023    Hyperlipidemia 12/04/2023    Mixed hyperlipidemia 12/04/2023    Abnormal gait 10/19/2023    Acute alteration in mental status 10/19/2023    Acute on chronic cholecystitis 10/19/2023    Age-related nuclear cataract of left eye 10/19/2023    Anxiety 10/19/2023    Artificial lens present 10/19/2023    Atrophic vaginitis 10/19/2023    Bipolar 1 disorder (CMS/[facility]) 10/19/2023    Carpal tunnel syndrome 10/19/2023    Cataract 10/19/2023    Chest pain 10/19/2023    Anxiety and depression 04/19/2023    Other hyperlipidemia 04/19/2023    Gastroesophageal reflux disease without esophagitis 04/19/2023    Arthritis 04/19/2023    Cocaine use disorder, severe, in sustained remission (CMS/[facility]) 01/27/2023    Moderate episode of recurrent major depressive disorder (CMS/[facility]) 01/27/2023    Generalized anxiety disorder 01/27/2023    Cervicalgia 08/23/2011    Rotator cuff (capsule) sprain 08/23/2011    Seborrheic keratosis 02/18/2011    Benign neoplasm of colon 06/29/2009    Irritable bowel syndrome 06/29/2009    Tobacco use disorder 06/29/2009        Medications  Current Outpatient Medications on File Prior to Visit   Medication Sig    albuterol 90 mcg/actuation inhaler     gabapentin (Neurontin) 100 mg capsule Take 1 capsule (100 mg) by mouth 3 times a day.    hydrOXYzine HCL (Atarax) 25 mg tablet Take 1 tablet (25 mg) by mouth every 4 hours if needed.    mirtazapine (Remeron) 15 mg tablet Take 1 tablet (15 mg) by mouth once daily at bedtime.    sertraline (Zoloft) 100 mg tablet Take 1  tablet (100 mg) by mouth twice a day.    Wellbutrin  mg 24 hr tablet Take 1 tablet (300 mg) by mouth once every 24 hours.    [DISCONTINUED] pantoprazole (Protonix) 40 mg EC tablet Take 1 tablet (40 mg) by mouth 2 times a day.    [DISCONTINUED] simvastatin (Zocor) 20 mg tablet Take 1 tablet (20 mg) by mouth once daily at bedtime.    [DISCONTINUED] busPIRone (Buspar) 15 mg tablet Take 2 tablets (30 mg) by mouth twice a day.    [DISCONTINUED] hydroxychloroquine (Plaquenil) 200 mg tablet Take 1 tablet (200 mg) by mouth once daily.    [DISCONTINUED] traMADol (Ultram) 50 mg tablet     [DISCONTINUED] traZODone (Desyrel) 100 mg tablet Take 2 tablets (200 mg) by mouth once daily at bedtime.     No current facility-administered medications on file prior to visit.        Surgical History  She has a past surgical history that includes Cholecystectomy.     Social History  She reports that she has never smoked. She has never been exposed to tobacco smoke. She has never used smokeless tobacco. She reports current alcohol use. She reports that she does not use drugs.    Family History  No family history on file.     Allergies  Patient has no known allergies.    Immunizations  Immunization History   Administered Date(s) Administered    Flu vaccine (IIV4), preservative free *Check age/dose* 09/27/2018    Flu vaccine, quadrivalent, high-dose, preservative free, age 65y+ (FLUZONE) 10/12/2020, 10/04/2021    Influenza Whole 11/03/2008    Influenza, seasonal, injectable 10/11/2011, 08/27/2012, 11/11/2013, 10/08/2014    Moderna COVID-19 vaccine, bivalent, blue cap/gray label *Check age/dose* 11/04/2022    Moderna SARS-CoV-2 Vaccination 03/04/2021, 04/01/2021, 11/08/2021    Pneumococcal conjugate vaccine, 13-valent (PREVNAR 13) 08/16/2022    Pneumococcal polysaccharide vaccine, 23-valent, age 2 years and older (PNEUMOVAX 23) 09/19/2018    Tdap vaccine, age 7 year and older (BOOSTRIX, ADACEL) 06/29/2009, 04/25/2014    Zoster, live  "08/07/2017        ROS  Negative, except as discussed in HPI     Vitals  /80   Pulse 81   Temp 35.6 °C (96.1 °F)   Resp 18   Ht 1.626 m (5' 4\")   Wt 98.3 kg (216 lb 12.8 oz)   SpO2 94%   BMI 37.21 kg/m²      Physical Exam  Vitals and nursing note reviewed.   Constitutional:       Appearance: Normal appearance.   HENT:      Head: Normocephalic.      Right Ear: Tympanic membrane normal.      Left Ear: Tympanic membrane normal.      Nose: Nose normal.      Mouth/Throat:      Mouth: Mucous membranes are moist.   Eyes:      Extraocular Movements: Extraocular movements intact.      Conjunctiva/sclera: Conjunctivae normal.      Pupils: Pupils are equal, round, and reactive to light.   Cardiovascular:      Rate and Rhythm: Normal rate and regular rhythm.      Heart sounds: Normal heart sounds.   Pulmonary:      Effort: Pulmonary effort is normal. No respiratory distress.      Breath sounds: Normal breath sounds.   Abdominal:      General: Abdomen is flat.      Palpations: Abdomen is soft.      Tenderness: There is no abdominal tenderness.   Musculoskeletal:      Cervical back: Neck supple.   Lymphadenopathy:      Cervical: No cervical adenopathy.   Skin:     General: Skin is warm and dry.      Findings: No rash.   Neurological:      General: No focal deficit present.      Mental Status: She is alert. Mental status is at baseline.      Coordination: Coordination normal.      Gait: Gait normal.      Deep Tendon Reflexes: Reflexes normal.   Psychiatric:         Mood and Affect: Mood normal.         Behavior: Behavior normal.         Relevant Results  Lab Results   Component Value Date    WBC 5.7 07/13/2023    WBC 5.9 04/10/2023    HGB 13.0 07/13/2023    HGB 13.5 04/10/2023    HCT 40.0 07/13/2023    HCT 40.4 04/10/2023    MCV 94 07/13/2023    MCV 93.1 04/10/2023     07/13/2023     04/10/2023     Lab Results   Component Value Date     07/13/2023     04/10/2023    K 4.6 07/13/2023    K 4.2 " 04/10/2023    CL 99 07/13/2023    CL 98 04/10/2023    CO2 29 07/13/2023    CO2 26 04/10/2023    BUN 9 07/13/2023    BUN 9 04/10/2023    CREATININE 0.63 07/13/2023    CREATININE 0.7 04/10/2023    CALCIUM 9.9 07/13/2023    CALCIUM 9.6 04/10/2023    PROT 7.6 07/13/2023    PROT 7.6 04/10/2023    BILITOT 0.7 07/13/2023    BILITOT 0.5 04/10/2023    ALKPHOS 111 07/13/2023    ALKPHOS 139 (H) 04/10/2023    ALT 73 (H) 07/13/2023    ALT 68 (H) 04/10/2023    AST 66 (H) 07/13/2023    AST 72 (H) 04/10/2023    GLUCOSE 100 (H) 07/13/2023    GLUCOSE 120 (H) 04/10/2023     Lab Results   Component Value Date    HGBA1C 4.6 04/10/2023     Lab Results   Component Value Date    TSH 2.33 07/13/2023    FREET4 0.9 10/16/2018      Lab Results   Component Value Date    CHOL 265 (H) 07/13/2023    TRIG 90 07/13/2023    .9 07/13/2023           Assessment/Plan   Johanny was seen today for medicare annual wellness visit subsequent.  Diagnoses and all orders for this visit:  Annual physical exam (Primary)  -     Comprehensive Metabolic Panel; Future  -     Lipid Panel; Future  -     TSH with reflex to Free T4 if abnormal; Future  -     Hemoglobin A1C; Future  Gastroesophageal reflux disease without esophagitis  Comments:  trial to reduce PPI dose from BID to once daily  Orders:  -     pantoprazole (Protonix) 40 mg EC tablet; Take 1 tablet (40 mg) by mouth once daily in the morning. Take before meals.  Other hyperlipidemia  -     Cancel: CT cardiac scoring wo IV contrast; Future  -     simvastatin (Zocor) 20 mg tablet; Take 1 tablet (20 mg) by mouth once daily at bedtime.  Chronic diastolic heart failure (CMS/HCC)  -     Cancel: Referral to Cardiology; Future  -     Referral to Cardiology; Future  Encounter for screening mammogram for malignant neoplasm of breast  -     BI mammo bilateral screening tomosynthesis; Future  Vitamin D deficiency  -     Vitamin D 25-Hydroxy,Total (for eval of Vitamin D levels); Future  Screening for colon cancer  -      Cologuard® colon cancer screening; Future  -     Cologuard® colon cancer screening  Dyspnea on exertion  -     Complete Pulmonary Function Test (Spirometry/DLCO/Lung Volumes); Future  Other orders  -     Follow Up In Primary Care - Established; Future       Medications Discontinued During This Encounter   Medication Reason    busPIRone (Buspar) 15 mg tablet Med List Cleanup    traMADol (Ultram) 50 mg tablet Med List Cleanup    traZODone (Desyrel) 100 mg tablet Med List Cleanup    hydroxychloroquine (Plaquenil) 200 mg tablet Med List Cleanup    pantoprazole (Protonix) 40 mg EC tablet Reorder    simvastatin (Zocor) 20 mg tablet Reorder        Counseling:   Medication education:   -Education:  The patient is counseled regarding potential side-effects of any and all new medications  -Understanding:  Patient expressed understanding of information discussed today  -Adherence:  No barriers to adherence identified    Final treatment plan is a result of shared decision making with patient.         Ravindra Miramontes MD

## 2024-03-18 ENCOUNTER — HOSPITAL ENCOUNTER (OUTPATIENT)
Dept: RADIOLOGY | Facility: HOSPITAL | Age: 69
Discharge: HOME | End: 2024-03-18
Payer: MEDICARE

## 2024-03-18 ENCOUNTER — TELEPHONE (OUTPATIENT)
Dept: PRIMARY CARE | Facility: CLINIC | Age: 69
End: 2024-03-18

## 2024-03-18 VITALS — HEIGHT: 64 IN | WEIGHT: 215 LBS | BODY MASS INDEX: 36.7 KG/M2

## 2024-03-18 DIAGNOSIS — Z12.31 ENCOUNTER FOR SCREENING MAMMOGRAM FOR MALIGNANT NEOPLASM OF BREAST: ICD-10-CM

## 2024-03-18 PROCEDURE — 77067 SCR MAMMO BI INCL CAD: CPT

## 2024-03-20 ENCOUNTER — APPOINTMENT (OUTPATIENT)
Dept: RESPIRATORY THERAPY | Facility: HOSPITAL | Age: 69
End: 2024-03-20
Payer: MEDICARE

## 2024-03-22 NOTE — TELEPHONE ENCOUNTER
Spoke with patient and she states she ended up scheduling appt for 4/9/24 to see cardiology and has pulm appt after that and will have bw done soon.

## 2024-04-03 ENCOUNTER — HOSPITAL ENCOUNTER (OUTPATIENT)
Dept: RESPIRATORY THERAPY | Facility: HOSPITAL | Age: 69
Setting detail: THERAPIES SERIES
Discharge: HOME | End: 2024-04-03
Payer: MEDICARE

## 2024-04-03 DIAGNOSIS — R06.09 DYSPNEA ON EXERTION: ICD-10-CM

## 2024-04-03 PROCEDURE — 94640 AIRWAY INHALATION TREATMENT: CPT | Mod: 59

## 2024-04-03 PROCEDURE — 2500000002 HC RX 250 W HCPCS SELF ADMINISTERED DRUGS (ALT 637 FOR MEDICARE OP, ALT 636 FOR OP/ED)

## 2024-04-03 PROCEDURE — 94726 PLETHYSMOGRAPHY LUNG VOLUMES: CPT

## 2024-04-03 RX ORDER — ALBUTEROL SULFATE 0.83 MG/ML
SOLUTION RESPIRATORY (INHALATION)
Status: COMPLETED
Start: 2024-04-03 | End: 2024-04-03

## 2024-04-03 RX ADMIN — ALBUTEROL SULFATE: 2.5 SOLUTION RESPIRATORY (INHALATION) at 11:15

## 2024-04-09 ENCOUNTER — APPOINTMENT (OUTPATIENT)
Dept: CARDIOLOGY | Facility: CLINIC | Age: 69
End: 2024-04-09
Payer: MEDICARE

## 2024-04-10 ENCOUNTER — TELEPHONE (OUTPATIENT)
Dept: PRIMARY CARE | Facility: CLINIC | Age: 69
End: 2024-04-10
Payer: MEDICARE

## 2024-04-10 NOTE — TELEPHONE ENCOUNTER
PT REQUESTING ANOTHER REFERRAL FOR PULMONARY DOCTOR THE ONE SHE IS HAVING DIFFICULTY SCHEDULING WITH THAT OFFICE

## 2024-05-02 ENCOUNTER — TELEPHONE (OUTPATIENT)
Dept: PRIMARY CARE | Facility: CLINIC | Age: 69
End: 2024-05-02
Payer: MEDICARE

## 2024-05-02 NOTE — TELEPHONE ENCOUNTER
PT WANTED TO LET MD KNOW SHE STILL HAS NOT GOTTEN HER LAB WORK DONE BUT SHE WILL BE GETTING IT AND ALSO WANTED TO LET HER KNOW SHE WENT TO PULMONOLOGIST WAS GIVEN INHALER AND SHE FEELS MUCH BETTER

## 2024-05-15 ENCOUNTER — TELEPHONE (OUTPATIENT)
Dept: PRIMARY CARE | Facility: CLINIC | Age: 69
End: 2024-05-15
Payer: MEDICARE

## 2024-05-15 NOTE — TELEPHONE ENCOUNTER
PT STATES SHE WAS LOWERED ON DOSAGE OF PROTONIX IS ALSO ON STIOLTO RESPINAT 2 PUFFS FROM DR PABON SHE IS HAVING ACID REFLUX IS NOT SURE WHAT IT IS FROM AND WOULD TO DISCUSS THIS

## 2024-05-22 ENCOUNTER — HOSPITAL ENCOUNTER (OUTPATIENT)
Dept: RADIOLOGY | Facility: EXTERNAL LOCATION | Age: 69
Discharge: HOME | End: 2024-05-22
Payer: MEDICARE

## 2024-05-22 DIAGNOSIS — M54.50 LOW BACK PAIN, UNSPECIFIED BACK PAIN LATERALITY, UNSPECIFIED CHRONICITY, UNSPECIFIED WHETHER SCIATICA PRESENT: ICD-10-CM

## 2024-05-28 ENCOUNTER — TELEPHONE (OUTPATIENT)
Dept: PRIMARY CARE | Facility: CLINIC | Age: 69
End: 2024-05-28

## 2024-05-28 NOTE — TELEPHONE ENCOUNTER
APRIL  FROM [facility] CARE PLANS ARE AVAILABLE ON THE PORTAL WANT TO INVITE TO ROUNDS MEETING 6/12 10:30 AM CAN [facility] [phone]

## 2024-05-30 ENCOUNTER — OFFICE VISIT (OUTPATIENT)
Dept: PRIMARY CARE | Facility: CLINIC | Age: 69
End: 2024-05-30
Payer: MEDICARE

## 2024-05-30 VITALS
HEART RATE: 81 BPM | TEMPERATURE: 96.4 F | BODY MASS INDEX: 36.43 KG/M2 | HEIGHT: 64 IN | WEIGHT: 213.4 LBS | DIASTOLIC BLOOD PRESSURE: 70 MMHG | OXYGEN SATURATION: 95 % | SYSTOLIC BLOOD PRESSURE: 110 MMHG

## 2024-05-30 DIAGNOSIS — K21.9 GASTROESOPHAGEAL REFLUX DISEASE WITHOUT ESOPHAGITIS: ICD-10-CM

## 2024-05-30 DIAGNOSIS — M54.42 ACUTE LEFT-SIDED LOW BACK PAIN WITH LEFT-SIDED SCIATICA: Primary | ICD-10-CM

## 2024-05-30 PROCEDURE — 1125F AMNT PAIN NOTED PAIN PRSNT: CPT | Performed by: FAMILY MEDICINE

## 2024-05-30 PROCEDURE — 1036F TOBACCO NON-USER: CPT | Performed by: FAMILY MEDICINE

## 2024-05-30 PROCEDURE — 3008F BODY MASS INDEX DOCD: CPT | Performed by: FAMILY MEDICINE

## 2024-05-30 PROCEDURE — 99213 OFFICE O/P EST LOW 20 MIN: CPT | Performed by: FAMILY MEDICINE

## 2024-05-30 PROCEDURE — 1160F RVW MEDS BY RX/DR IN RCRD: CPT | Performed by: FAMILY MEDICINE

## 2024-05-30 PROCEDURE — 1159F MED LIST DOCD IN RCRD: CPT | Performed by: FAMILY MEDICINE

## 2024-05-30 RX ORDER — TIOTROPIUM BROMIDE AND OLODATEROL 3.124; 2.736 UG/1; UG/1
2 SPRAY, METERED RESPIRATORY (INHALATION) DAILY
COMMUNITY
Start: 2024-04-18

## 2024-05-30 RX ORDER — PANTOPRAZOLE SODIUM 40 MG/1
40 TABLET, DELAYED RELEASE ORAL 2 TIMES DAILY
Qty: 180 TABLET | Refills: 1 | Status: SHIPPED | OUTPATIENT
Start: 2024-05-30 | End: 2025-05-30

## 2024-05-30 RX ORDER — BUPROPION HYDROCHLORIDE 150 MG/1
150 TABLET ORAL DAILY
COMMUNITY

## 2024-05-30 RX ORDER — CYCLOBENZAPRINE HCL 10 MG
5 TABLET ORAL 3 TIMES DAILY PRN
Qty: 40 TABLET | Refills: 0 | Status: SHIPPED | OUTPATIENT
Start: 2024-05-30

## 2024-05-30 RX ORDER — HYDROXYZINE PAMOATE 25 MG/1
25 CAPSULE ORAL NIGHTLY
COMMUNITY
Start: 2024-05-13 | End: 2024-05-30 | Stop reason: WASHOUT

## 2024-05-30 ASSESSMENT — PATIENT HEALTH QUESTIONNAIRE - PHQ9
8. MOVING OR SPEAKING SO SLOWLY THAT OTHER PEOPLE COULD HAVE NOTICED. OR THE OPPOSITE, BEING SO FIGETY OR RESTLESS THAT YOU HAVE BEEN MOVING AROUND A LOT MORE THAN USUAL: NOT AT ALL
7. TROUBLE CONCENTRATING ON THINGS, SUCH AS READING THE NEWSPAPER OR WATCHING TELEVISION: NOT AT ALL
10. IF YOU CHECKED OFF ANY PROBLEMS, HOW DIFFICULT HAVE THESE PROBLEMS MADE IT FOR YOU TO DO YOUR WORK, TAKE CARE OF THINGS AT HOME, OR GET ALONG WITH OTHER PEOPLE: NOT DIFFICULT AT ALL
9. THOUGHTS THAT YOU WOULD BE BETTER OFF DEAD, OR OF HURTING YOURSELF: NEARLY EVERY DAY
2. FEELING DOWN, DEPRESSED OR HOPELESS: SEVERAL DAYS
4. FEELING TIRED OR HAVING LITTLE ENERGY: NEARLY EVERY DAY
5. POOR APPETITE OR OVEREATING: NOT AT ALL
6. FEELING BAD ABOUT YOURSELF - OR THAT YOU ARE A FAILURE OR HAVE LET YOURSELF OR YOUR FAMILY DOWN: SEVERAL DAYS
1. LITTLE INTEREST OR PLEASURE IN DOING THINGS: NEARLY EVERY DAY
SUM OF ALL RESPONSES TO PHQ QUESTIONS 1-9: 13
SUM OF ALL RESPONSES TO PHQ9 QUESTIONS 1 AND 2: 4
3. TROUBLE FALLING OR STAYING ASLEEP OR SLEEPING TOO MUCH: MORE THAN HALF THE DAYS

## 2024-05-30 ASSESSMENT — ENCOUNTER SYMPTOMS
LOSS OF SENSATION IN FEET: 1
DEPRESSION: 1
OCCASIONAL FEELINGS OF UNSTEADINESS: 1

## 2024-05-30 ASSESSMENT — LIFESTYLE VARIABLES: HOW MANY STANDARD DRINKS CONTAINING ALCOHOL DO YOU HAVE ON A TYPICAL DAY: PATIENT DOES NOT DRINK

## 2024-05-30 ASSESSMENT — PAIN SCALES - GENERAL: PAINLEVEL: 10-WORST PAIN EVER

## 2024-05-30 NOTE — PROGRESS NOTES
History Of Present Illness  Johanny Rosales is a 68 y.o. female presenting for Follow-up (Medication follow up- discuss increasing Protonix dosage.)  .    HPI   She complains of left low back pain that has been persistent about 2 weeks.  It hurts in her left back and shoots down her left buttock and into her left leg. Went to   5/22/24 and was prescribed Prednison and flexeril. There was improvement in pain but it flared up after she ran out of medications.  She is now just taking ibuprofen 800mg one to twice daily.  She is having difficulty with sleep due to the pain.  She is ambulating with out any difficulties.    She also reports since decreasing her PPI from 40 mg twice daily to 40 mg once daily she has had a resurgence of acid reflux symptoms.  She request to be back on the twice daily dosing.    Past Medical History  Patient Active Problem List    Diagnosis Date Noted    Chronic pain syndrome 12/04/2023    Diplopia 12/04/2023    Exotropia of right eye 12/04/2023    Frequency-urgency syndrome 12/04/2023    Heart failure (Multi) 12/04/2023    Hyperhidrosis 12/04/2023    Impairment of balance 12/04/2023    Incontinence of urine in female 12/04/2023    Morbid (severe) obesity due to excess calories (Multi) 12/04/2023    Obesity (BMI 35.0-39.9 without comorbidity) 12/04/2023    Recurrent falls 12/04/2023    Tinnitus 12/04/2023    Urgency incontinence 12/04/2023    Vitamin D deficiency 12/04/2023    Depression 12/04/2023    Inflammatory arthritis 12/04/2023    Alcohol abuse 12/04/2023    GERD (gastroesophageal reflux disease) 12/04/2023    Hyperlipidemia 12/04/2023    Mixed hyperlipidemia 12/04/2023    Abnormal gait 10/19/2023    Acute alteration in mental status 10/19/2023    Acute on chronic cholecystitis 10/19/2023    Age-related nuclear cataract of left eye 10/19/2023    Anxiety 10/19/2023    Artificial lens present 10/19/2023    Atrophic vaginitis 10/19/2023    Bipolar 1 disorder (Multi) 10/19/2023    Carpal  tunnel syndrome 10/19/2023    Cataract 10/19/2023    Chest pain 10/19/2023    Anxiety and depression 04/19/2023    Other hyperlipidemia 04/19/2023    Gastroesophageal reflux disease without esophagitis 04/19/2023    Arthritis 04/19/2023    Cocaine use disorder, severe, in sustained remission (Multi) 01/27/2023    Moderate episode of recurrent major depressive disorder (Multi) 01/27/2023    Generalized anxiety disorder 01/27/2023    Cervicalgia 08/23/2011    Rotator cuff (capsule) sprain 08/23/2011    Seborrheic keratosis 02/18/2011    Benign neoplasm of colon 06/29/2009    Irritable bowel syndrome 06/29/2009    Tobacco use disorder 06/29/2009        Medications  Current Outpatient Medications on File Prior to Visit   Medication Sig    albuterol 90 mcg/actuation inhaler     buPROPion XL (Wellbutrin XL) 150 mg 24 hr tablet Take 1 tablet (150 mg) by mouth once daily. Do not crush, chew, or split.    gabapentin (Neurontin) 100 mg capsule Take 1 capsule (100 mg) by mouth 3 times a day.    hydrOXYzine HCL (Atarax) 25 mg tablet Take 1 tablet (25 mg) by mouth every 4 hours if needed.    mirtazapine (Remeron) 15 mg tablet Take 1 tablet (15 mg) by mouth once daily at bedtime.    sertraline (Zoloft) 100 mg tablet Take 1 tablet (100 mg) by mouth twice a day.    simvastatin (Zocor) 20 mg tablet Take 1 tablet (20 mg) by mouth once daily at bedtime.    Stiolto Respimat 2.5-2.5 mcg/actuation mist inhaler Inhale 2 Inhalations once daily.    Wellbutrin  mg 24 hr tablet Take 1 tablet (300 mg) by mouth once every 24 hours.    [DISCONTINUED] hydrOXYzine pamoate (Vistaril) 25 mg capsule Take 1 capsule (25 mg) by mouth once daily at bedtime.    [DISCONTINUED] pantoprazole (Protonix) 40 mg EC tablet Take 1 tablet (40 mg) by mouth once daily in the morning. Take before meals.     No current facility-administered medications on file prior to visit.        Surgical History  She has a past surgical history that includes  "Cholecystectomy.     Social History  She reports that she has never smoked. She has never been exposed to tobacco smoke. She has never used smokeless tobacco. She reports that she does not currently use alcohol. She reports that she does not use drugs.    Family History  Family History   Problem Relation Name Age of Onset    Breast cancer Sister  70        Allergies  Patient has no known allergies.    Immunizations  Immunization History   Administered Date(s) Administered    Flu vaccine (IIV4), preservative free *Check age/dose* 09/27/2018    Flu vaccine, quadrivalent, high-dose, preservative free, age 65y+ (FLUZONE) 10/12/2020, 10/04/2021    Influenza Whole 11/03/2008    Influenza, seasonal, injectable 10/11/2011, 08/27/2012, 11/11/2013, 10/08/2014    Moderna COVID-19 vaccine, bivalent, blue cap/gray label *Check age/dose* 11/04/2022    Pneumococcal conjugate vaccine, 13-valent (PREVNAR 13) 08/16/2022    Pneumococcal polysaccharide vaccine, 23-valent, age 2 years and older (PNEUMOVAX 23) 09/19/2018    Tdap vaccine, age 7 year and older (BOOSTRIX, ADACEL) 06/29/2009, 04/25/2014    Zoster, live 08/07/2017        ROS  Negative, except as discussed in HPI     Vitals  /70   Pulse 81   Temp 35.8 °C (96.4 °F)   Ht 1.626 m (5' 4\")   Wt 96.8 kg (213 lb 6.4 oz)   LMP  (LMP Unknown)   SpO2 95%   BMI 36.63 kg/m²      Physical Exam  Vitals and nursing note reviewed.   Constitutional:       Appearance: Normal appearance.   Musculoskeletal:         General: No tenderness. Normal range of motion.      Cervical back: Normal. No spasms or bony tenderness.      Thoracic back: Normal. No spasms, tenderness or bony tenderness.      Lumbar back: Normal. No spasms, tenderness or bony tenderness. Negative right straight leg raise test and negative left straight leg raise test.   Neurological:      General: No focal deficit present.      Mental Status: She is alert.      Gait: Gait normal.      Deep Tendon Reflexes: Reflexes " normal.         Relevant Results  Lab Results   Component Value Date    WBC 5.7 07/13/2023    WBC 5.9 04/10/2023    HGB 13.0 07/13/2023    HGB 13.5 04/10/2023    HCT 40.0 07/13/2023    HCT 40.4 04/10/2023    MCV 94 07/13/2023    MCV 93.1 04/10/2023     07/13/2023     04/10/2023     Lab Results   Component Value Date     07/13/2023     04/10/2023    K 4.6 07/13/2023    K 4.2 04/10/2023    CL 99 07/13/2023    CL 98 04/10/2023    CO2 29 07/13/2023    CO2 26 04/10/2023    BUN 9 07/13/2023    BUN 9 04/10/2023    CREATININE 0.63 07/13/2023    CREATININE 0.7 04/10/2023    CALCIUM 9.9 07/13/2023    CALCIUM 9.6 04/10/2023    PROT 7.6 07/13/2023    PROT 7.6 04/10/2023    BILITOT 0.7 07/13/2023    BILITOT 0.5 04/10/2023    ALKPHOS 111 07/13/2023    ALKPHOS 139 (H) 04/10/2023    ALT 73 (H) 07/13/2023    ALT 68 (H) 04/10/2023    AST 66 (H) 07/13/2023    AST 72 (H) 04/10/2023    GLUCOSE 100 (H) 07/13/2023    GLUCOSE 120 (H) 04/10/2023     Lab Results   Component Value Date    HGBA1C 4.6 04/10/2023     Lab Results   Component Value Date    TSH 2.33 07/13/2023    FREET4 0.9 10/16/2018      Lab Results   Component Value Date    CHOL 265 (H) 07/13/2023    TRIG 90 07/13/2023    .9 07/13/2023           Assessment/Plan   Johanny was seen today for follow-up.  Diagnoses and all orders for this visit:  Acute left-sided low back pain with left-sided sciatica (Primary)  -     cyclobenzaprine (Flexeril) 10 mg tablet; Take 0.5 tablets (5 mg) by mouth 3 times a day as needed for muscle spasms.  -     Referral to Physical Therapy; Future  Gastroesophageal reflux disease without esophagitis  Comments:  failed trial to reduce PPI dose from BID to once daily  Orders:  -     pantoprazole (Protonix) 40 mg EC tablet; Take 1 tablet (40 mg) by mouth 2 times a day.         Counseling:   Medication education:   -Education:  The patient is counseled regarding potential side-effects of any and all new  medications  -Understanding:  Patient expressed understanding of information discussed today  -Adherence:  No barriers to adherence identified    Final treatment plan is a result of shared decision making with patient.         Ravindra Miramontes MD

## 2024-06-19 ENCOUNTER — OFFICE VISIT (OUTPATIENT)
Dept: PRIMARY CARE | Facility: CLINIC | Age: 69
End: 2024-06-19
Payer: MEDICARE

## 2024-06-19 VITALS
TEMPERATURE: 95.9 F | RESPIRATION RATE: 18 BRPM | DIASTOLIC BLOOD PRESSURE: 70 MMHG | OXYGEN SATURATION: 93 % | SYSTOLIC BLOOD PRESSURE: 130 MMHG | HEIGHT: 64 IN | BODY MASS INDEX: 37.22 KG/M2 | WEIGHT: 218 LBS | HEART RATE: 85 BPM

## 2024-06-19 DIAGNOSIS — S99.922A INJURY OF TOE ON LEFT FOOT, INITIAL ENCOUNTER: Primary | ICD-10-CM

## 2024-06-19 DIAGNOSIS — F31.9 BIPOLAR 1 DISORDER (MULTI): ICD-10-CM

## 2024-06-19 DIAGNOSIS — E66.01 MORBID (SEVERE) OBESITY DUE TO EXCESS CALORIES (MULTI): ICD-10-CM

## 2024-06-19 DIAGNOSIS — F14.21 COCAINE USE DISORDER, SEVERE, IN SUSTAINED REMISSION (MULTI): ICD-10-CM

## 2024-06-19 PROCEDURE — 1159F MED LIST DOCD IN RCRD: CPT | Performed by: NURSE PRACTITIONER

## 2024-06-19 PROCEDURE — 3008F BODY MASS INDEX DOCD: CPT | Performed by: NURSE PRACTITIONER

## 2024-06-19 PROCEDURE — 1036F TOBACCO NON-USER: CPT | Performed by: NURSE PRACTITIONER

## 2024-06-19 PROCEDURE — 99214 OFFICE O/P EST MOD 30 MIN: CPT | Performed by: NURSE PRACTITIONER

## 2024-06-19 PROCEDURE — 1125F AMNT PAIN NOTED PAIN PRSNT: CPT | Performed by: NURSE PRACTITIONER

## 2024-06-19 ASSESSMENT — ENCOUNTER SYMPTOMS
CONSTITUTIONAL NEGATIVE: 1
RESPIRATORY NEGATIVE: 1
DEPRESSION: 1
LOSS OF SENSATION IN FEET: 0
JOINT SWELLING: 1
OCCASIONAL FEELINGS OF UNSTEADINESS: 1
CARDIOVASCULAR NEGATIVE: 1

## 2024-06-19 ASSESSMENT — PATIENT HEALTH QUESTIONNAIRE - PHQ9
9. THOUGHTS THAT YOU WOULD BE BETTER OFF DEAD, OR OF HURTING YOURSELF: NOT AT ALL
4. FEELING TIRED OR HAVING LITTLE ENERGY: SEVERAL DAYS
2. FEELING DOWN, DEPRESSED OR HOPELESS: MORE THAN HALF THE DAYS
7. TROUBLE CONCENTRATING ON THINGS, SUCH AS READING THE NEWSPAPER OR WATCHING TELEVISION: NOT AT ALL
6. FEELING BAD ABOUT YOURSELF - OR THAT YOU ARE A FAILURE OR HAVE LET YOURSELF OR YOUR FAMILY DOWN: NEARLY EVERY DAY
8. MOVING OR SPEAKING SO SLOWLY THAT OTHER PEOPLE COULD HAVE NOTICED. OR THE OPPOSITE, BEING SO FIGETY OR RESTLESS THAT YOU HAVE BEEN MOVING AROUND A LOT MORE THAN USUAL: NOT AT ALL
5. POOR APPETITE OR OVEREATING: MORE THAN HALF THE DAYS
SUM OF ALL RESPONSES TO PHQ9 QUESTIONS 1 AND 2: 5
1. LITTLE INTEREST OR PLEASURE IN DOING THINGS: NEARLY EVERY DAY
10. IF YOU CHECKED OFF ANY PROBLEMS, HOW DIFFICULT HAVE THESE PROBLEMS MADE IT FOR YOU TO DO YOUR WORK, TAKE CARE OF THINGS AT HOME, OR GET ALONG WITH OTHER PEOPLE: SOMEWHAT DIFFICULT
3. TROUBLE FALLING OR STAYING ASLEEP OR SLEEPING TOO MUCH: SEVERAL DAYS
SUM OF ALL RESPONSES TO PHQ QUESTIONS 1-9: 12

## 2024-06-19 ASSESSMENT — PAIN SCALES - GENERAL: PAINLEVEL: 6

## 2024-06-19 NOTE — PROGRESS NOTES
"Chief Complaint  Johanny Rosales is a 68 y.o. female presenting for \"Fall (DR JESUS PT HERE BECAUSE SHE HAD A FALL ABOUT 5 DAYS AGO, SHE THINKS SHE BROKE HER SMALL TOE ON LEFT FOOT. TOE IS SWOLLEN, RED AND PAINFUL ).\"    HPI     Johanny Rosales is a 68 y.o. female presenting for painful pinky toe left foot fell at home thinks it bent backwards did not feel or hear anything pop that she is aware of bruised erythematous and painful she has it zachary taped to the next toe has not been doing anything for it no ice elevation rest we will put in for x-ray and referral to podiatry if it is broken and states she will go get the x-ray done tomorrow      Past Medical History  Patient Active Problem List    Diagnosis Date Noted    Chronic pain syndrome 12/04/2023    Diplopia 12/04/2023    Exotropia of right eye 12/04/2023    Frequency-urgency syndrome 12/04/2023    Heart failure (Multi) 12/04/2023    Hyperhidrosis 12/04/2023    Impairment of balance 12/04/2023    Incontinence of urine in female 12/04/2023    Morbid (severe) obesity due to excess calories (Multi) 12/04/2023    Obesity (BMI 35.0-39.9 without comorbidity) 12/04/2023    Recurrent falls 12/04/2023    Tinnitus 12/04/2023    Urgency incontinence 12/04/2023    Vitamin D deficiency 12/04/2023    Depression 12/04/2023    Inflammatory arthritis 12/04/2023    Alcohol abuse 12/04/2023    GERD (gastroesophageal reflux disease) 12/04/2023    Hyperlipidemia 12/04/2023    Mixed hyperlipidemia 12/04/2023    Abnormal gait 10/19/2023    Acute alteration in mental status 10/19/2023    Acute on chronic cholecystitis 10/19/2023    Age-related nuclear cataract of left eye 10/19/2023    Anxiety 10/19/2023    Artificial lens present 10/19/2023    Atrophic vaginitis 10/19/2023    Bipolar 1 disorder (Multi) 10/19/2023    Carpal tunnel syndrome 10/19/2023    Cataract 10/19/2023    Chest pain 10/19/2023    Anxiety and depression 04/19/2023    Other hyperlipidemia 04/19/2023    " Gastroesophageal reflux disease without esophagitis 04/19/2023    Arthritis 04/19/2023    Cocaine use disorder, severe, in sustained remission (Multi) 01/27/2023    Moderate episode of recurrent major depressive disorder (Multi) 01/27/2023    Generalized anxiety disorder 01/27/2023    Cervicalgia 08/23/2011    Rotator cuff (capsule) sprain 08/23/2011    Seborrheic keratosis 02/18/2011    Benign neoplasm of colon 06/29/2009    Irritable bowel syndrome 06/29/2009    Tobacco use disorder 06/29/2009        Medications  Current Outpatient Medications   Medication Instructions    albuterol 90 mcg/actuation inhaler     buPROPion XL (WELLBUTRIN XL) 150 mg, oral, Daily, Do not crush, chew, or split.    gabapentin (NEURONTIN) 100 mg, oral, 3 times daily    hydrOXYzine HCL (ATARAX) 25 mg, oral, Every 4 hours PRN    mirtazapine (REMERON) 15 mg, oral, Nightly    pantoprazole (PROTONIX) 40 mg, oral, 2 times daily    sertraline (ZOLOFT) 100 mg, oral, 2 times daily    simvastatin (ZOCOR) 20 mg, oral, Nightly    Stiolto Respimat 2.5-2.5 mcg/actuation mist inhaler 2 Inhalations, inhalation, Daily    Wellbutrin  mg, oral, Every 24 hours        Surgical History  She has a past surgical history that includes Cholecystectomy.     Social History  She reports that she has never smoked. She has never been exposed to tobacco smoke. She has never used smokeless tobacco. She reports that she does not currently use alcohol. She reports that she does not use drugs.    Family History  Family History   Problem Relation Name Age of Onset    Breast cancer Sister  70        Allergies  Patient has no known allergies.    ROS  Review of Systems   Constitutional: Negative.    Respiratory: Negative.     Cardiovascular: Negative.    Musculoskeletal:  Positive for gait problem and joint swelling (Pinky toe left foot).        Last Recorded Vitals  /70 (BP Location: Right arm, Patient Position: Sitting, BP Cuff Size: Adult)   Pulse 85   Temp  35.5 °C (95.9 °F)   Resp 18   Wt 98.9 kg (218 lb)   SpO2 93%     Physical Exam  Vitals and nursing note reviewed.   Cardiovascular:      Rate and Rhythm: Normal rate and regular rhythm.      Pulses: Normal pulses.      Heart sounds: Normal heart sounds.   Pulmonary:      Effort: Pulmonary effort is normal.      Breath sounds: Normal breath sounds.   Musculoskeletal:      Comments: Pinky toe left foot erythematous ecchymotic slightly edematous decreased range of motion   Neurological:      Mental Status: She is alert.         Relevant Results      Assessment/Plan   Johanny was seen today for fall.  Diagnoses and all orders for this visit:  Injury of toe on left foot, initial encounter (Primary)  -     XR foot left 1-2 views; Future  Cocaine use disorder, severe, in sustained remission (Multi)  Comments:  remains clean  Bipolar 1 disorder (Multi)  Comments:  Goes to   Morbid (severe) obesity due to excess calories (Multi)  Comments:  Stable          COUNSELING      Medication education:              Education:  The patient is counseled regarding potential side-effects of any and all new medications             Understanding:  Patient expressed understanding             Adherence:  No barriers to adherence identified        Minna Ferris, APRN-CNP         Patient was identified as a fall risk. Risk prevention instructions provided.

## 2024-06-20 ENCOUNTER — TELEPHONE (OUTPATIENT)
Dept: PRIMARY CARE | Facility: CLINIC | Age: 69
End: 2024-06-20
Payer: MEDICARE

## 2024-06-20 ENCOUNTER — APPOINTMENT (OUTPATIENT)
Dept: PHYSICAL THERAPY | Facility: CLINIC | Age: 69
End: 2024-06-20
Payer: MEDICARE

## 2024-06-20 DIAGNOSIS — S99.922A INJURY OF TOE ON LEFT FOOT, INITIAL ENCOUNTER: Primary | ICD-10-CM

## 2024-06-20 NOTE — TELEPHONE ENCOUNTER
PT WOULD LIKE TO KNOW THE OPTIONS FOR HER BABY TOE PT WAS SEEN YESTERDAY PT HAS NOT DONE THE XRAY YET THAT WAS ORDERED PT STATES IT IS HOT OUT AND DOES NOT WANT TO SIT IN THE DEPT FOR A LONG TIME

## 2024-06-21 NOTE — TELEPHONE ENCOUNTER
SPOKE WITH PT, SHE STATES THAT DUE TO THE HEAT SHE HAS NOT GONE TO GET FOOT XRAY DONE BUT PLANS TO DO SO BY MONDAY, REFERRAL INFO GIVEN.

## 2024-06-25 ENCOUNTER — TELEPHONE (OUTPATIENT)
Dept: PRIMARY CARE | Facility: CLINIC | Age: 69
End: 2024-06-25
Payer: MEDICARE

## 2024-07-01 ENCOUNTER — OFFICE VISIT (OUTPATIENT)
Dept: ORTHOPEDIC SURGERY | Facility: CLINIC | Age: 69
End: 2024-07-01
Payer: MEDICARE

## 2024-07-01 ENCOUNTER — HOSPITAL ENCOUNTER (OUTPATIENT)
Dept: RADIOLOGY | Facility: CLINIC | Age: 69
Discharge: HOME | End: 2024-07-01
Payer: MEDICARE

## 2024-07-01 VITALS — WEIGHT: 216 LBS | BODY MASS INDEX: 36.88 KG/M2 | HEIGHT: 64 IN

## 2024-07-01 DIAGNOSIS — S92.912A FRACTURE OF PROXIMAL PHALANX OF TOE OF LEFT FOOT: ICD-10-CM

## 2024-07-01 DIAGNOSIS — R52 PAIN: ICD-10-CM

## 2024-07-01 DIAGNOSIS — M79.675 TOE PAIN, LEFT: Primary | ICD-10-CM

## 2024-07-01 DIAGNOSIS — M79.89 SWELLING OF TOE OF LEFT FOOT: ICD-10-CM

## 2024-07-01 PROCEDURE — 1160F RVW MEDS BY RX/DR IN RCRD: CPT | Performed by: PHYSICIAN ASSISTANT

## 2024-07-01 PROCEDURE — 3008F BODY MASS INDEX DOCD: CPT | Performed by: PHYSICIAN ASSISTANT

## 2024-07-01 PROCEDURE — 99213 OFFICE O/P EST LOW 20 MIN: CPT | Performed by: PHYSICIAN ASSISTANT

## 2024-07-01 PROCEDURE — 1036F TOBACCO NON-USER: CPT | Performed by: PHYSICIAN ASSISTANT

## 2024-07-01 PROCEDURE — 73630 X-RAY EXAM OF FOOT: CPT | Mod: LT

## 2024-07-01 PROCEDURE — 1125F AMNT PAIN NOTED PAIN PRSNT: CPT | Performed by: PHYSICIAN ASSISTANT

## 2024-07-01 PROCEDURE — 99203 OFFICE O/P NEW LOW 30 MIN: CPT | Performed by: PHYSICIAN ASSISTANT

## 2024-07-01 PROCEDURE — 73630 X-RAY EXAM OF FOOT: CPT | Mod: LEFT SIDE | Performed by: ORTHOPAEDIC SURGERY

## 2024-07-01 PROCEDURE — 1159F MED LIST DOCD IN RCRD: CPT | Performed by: PHYSICIAN ASSISTANT

## 2024-07-01 ASSESSMENT — ENCOUNTER SYMPTOMS
DEPRESSION: 0
OCCASIONAL FEELINGS OF UNSTEADINESS: 0
LOSS OF SENSATION IN FEET: 0

## 2024-07-01 ASSESSMENT — LIFESTYLE VARIABLES
HOW OFTEN DURING THE LAST YEAR HAVE YOU FOUND THAT YOU WERE NOT ABLE TO STOP DRINKING ONCE YOU HAD STARTED: NEVER
AUDIT TOTAL SCORE: 0
HOW OFTEN DURING THE LAST YEAR HAVE YOU BEEN UNABLE TO REMEMBER WHAT HAPPENED THE NIGHT BEFORE BECAUSE YOU HAD BEEN DRINKING: NEVER
HAS A RELATIVE, FRIEND, DOCTOR, OR ANOTHER HEALTH PROFESSIONAL EXPRESSED CONCERN ABOUT YOUR DRINKING OR SUGGESTED YOU CUT DOWN: NO
HOW OFTEN DO YOU HAVE SIX OR MORE DRINKS ON ONE OCCASION: NEVER
HOW OFTEN DURING THE LAST YEAR HAVE YOU FAILED TO DO WHAT WAS NORMALLY EXPECTED FROM YOU BECAUSE OF DRINKING: NEVER
HOW OFTEN DO YOU HAVE A DRINK CONTAINING ALCOHOL: NEVER
HOW OFTEN DURING THE LAST YEAR HAVE YOU HAD A FEELING OF GUILT OR REMORSE AFTER DRINKING: NEVER
AUDIT-C TOTAL SCORE: 0
HOW OFTEN DURING THE LAST YEAR HAVE YOU NEEDED AN ALCOHOLIC DRINK FIRST THING IN THE MORNING TO GET YOURSELF GOING AFTER A NIGHT OF HEAVY DRINKING: NEVER
HAVE YOU OR SOMEONE ELSE BEEN INJURED AS A RESULT OF YOUR DRINKING: NO
HOW MANY STANDARD DRINKS CONTAINING ALCOHOL DO YOU HAVE ON A TYPICAL DAY: PATIENT DOES NOT DRINK
SKIP TO QUESTIONS 9-10: 1

## 2024-07-01 ASSESSMENT — PATIENT HEALTH QUESTIONNAIRE - PHQ9
2. FEELING DOWN, DEPRESSED OR HOPELESS: NOT AT ALL
1. LITTLE INTEREST OR PLEASURE IN DOING THINGS: NOT AT ALL
SUM OF ALL RESPONSES TO PHQ9 QUESTIONS 1 AND 2: 0

## 2024-07-01 ASSESSMENT — PAIN SCALES - GENERAL
PAINLEVEL_OUTOF10: 5 - MODERATE PAIN
PAINLEVEL: 5

## 2024-07-01 ASSESSMENT — PAIN - FUNCTIONAL ASSESSMENT: PAIN_FUNCTIONAL_ASSESSMENT: 0-10

## 2024-07-01 NOTE — PROGRESS NOTES
Subjective      Chief Complaint   Patient presents with    Left Foot - Pain        HPI  This 68-year-old woman presents with left toe pain (3/10).  She states that 1 week ago she suffered a fall and noticed increased pain in her left small toe.  She denies hitting her head or loss of consciousness.  She states that she has been taping her left small and fourth toe together for support.  She is seen today wearing supportive shoes while walking.  She states that the left small toe pain is worse with and aggravated by prolonged walking.  She is able to resume most of her normal activities of daily living.  She presents today and request discussion of further treatment options.    CARDIOLOGY:   Negative for chest pain, shortness of breath.   RESPIRATORY:   Negative for chest pain, shortness of breath.   MUSCULOSKELETAL:   See HPI for details.   NEUROLOGY:   Negative for tingling, numbness, weakness.    Objective    There were no vitals filed for this visit.    Physical Exam  GENERAL:          General Appearance:  This is a pleasant patient with appropriate affect, in no acute distress.   DERMATOLOGY:          Skin: skin at the neck, upper and lower back, and trunk is intact. There is no evidence of skin rash, skin breakdown or ulceration, or atrophic skin change.   EXTREMITIES:          Vascular:  Right, left hands and feet are warm with good color and pulses. Right and left calf and thigh are nontender and nonswollen.   NEUROLOGICAL:          Orientation:  Patient is alert and oriented to person, place, time and situation. Right and left upper and lower extremity motor and sensory examinations are intact.      MUSCULOSKELETAL: Neck: Nontender. No pain with range of motion.  Left foot: There is diffuse tenderness over the left small toe.  There is swelling present.  Skin is clean dry and intact.  There is pain with palpation over the proximal phalanx of the left small toe.  No pain with palpation over the dorsum of the  left foot or along the left fifth metatarsal.  Compartments are soft.  Neurovascular is intact.  The patient is seen today walking with the use of a tennis shoe with a stable gait.    AP and lateral x-rays of the left foot done in the office today show a nondisplaced fracture at the left fifth toe proximal phalanx.    Johanny was seen today for pain.  Diagnoses and all orders for this visit:  Toe pain, left (Primary)  Swelling of toe of left foot  Fracture of proximal phalanx of toe of left foot  Options are discussed with the patient in detail. The patient is instructed to keep her left small toe taped to her left fourth toe for support and to wear tennis shoes at all times while up and walking for support.  The patient is instructed regarding activity modification and risk for further injury with falling or trauma, ice, provider directed at home gentle strengthening and ROM exercises, and the appropriate use of Tylenol as needed for pain with its potential adverse reactions and side effects. The patient understands.  Return in 4 weeks for jessica-ray or sooner as needed.  Please note that this report has been produced using speech recognition software.  It may contain errors related to grammar, punctuation or spelling.  Electronically signed, but not reviewed.    Claire Frankel PA-C

## 2024-07-08 ENCOUNTER — TELEPHONE (OUTPATIENT)
Dept: PRIMARY CARE | Facility: CLINIC | Age: 69
End: 2024-07-08
Payer: MEDICARE

## 2024-07-08 DIAGNOSIS — M54.42 ACUTE LEFT-SIDED LOW BACK PAIN WITH LEFT-SIDED SCIATICA: Primary | ICD-10-CM

## 2024-07-11 RX ORDER — CYCLOBENZAPRINE HCL 5 MG
5 TABLET ORAL NIGHTLY PRN
Qty: 30 TABLET | Refills: 0 | Status: SHIPPED | OUTPATIENT
Start: 2024-07-11 | End: 2024-09-09

## 2024-07-11 NOTE — TELEPHONE ENCOUNTER
Patient advised. States she starts PT wednesday  
Rx sent  
SPOKE WITH PATIENT. PATIENT REQUESTING AN RX FOR FLEXERIL. STATING DR JESUS HAS RECENTLY PRESCRIBED THIS FOR HER. STATES HER BACK IS REALLY HURTING HER. STATES SHE   CAN BARELY SIT. HAS PHYSICAL THERAPY SCHEDULED 7/16/24. WANTS RX SENT TO [facility]   
home

## 2024-07-16 ENCOUNTER — APPOINTMENT (OUTPATIENT)
Dept: PHYSICAL THERAPY | Facility: CLINIC | Age: 69
End: 2024-07-16
Payer: MEDICARE

## 2024-07-29 ENCOUNTER — HOSPITAL ENCOUNTER (OUTPATIENT)
Dept: RADIOLOGY | Facility: CLINIC | Age: 69
Discharge: HOME | End: 2024-07-29
Payer: MEDICARE

## 2024-07-29 ENCOUNTER — APPOINTMENT (OUTPATIENT)
Dept: ORTHOPEDIC SURGERY | Facility: CLINIC | Age: 69
End: 2024-07-29
Payer: MEDICARE

## 2024-07-29 ENCOUNTER — OFFICE VISIT (OUTPATIENT)
Dept: ORTHOPEDIC SURGERY | Facility: CLINIC | Age: 69
End: 2024-07-29
Payer: MEDICARE

## 2024-07-29 VITALS — RESPIRATION RATE: 16 BRPM | BODY MASS INDEX: 36.7 KG/M2 | HEIGHT: 64 IN | WEIGHT: 215 LBS

## 2024-07-29 DIAGNOSIS — R52 PAIN: ICD-10-CM

## 2024-07-29 DIAGNOSIS — S92.912A FRACTURE OF PROXIMAL PHALANX OF TOE OF LEFT FOOT: ICD-10-CM

## 2024-07-29 DIAGNOSIS — M79.89 SWELLING OF TOE OF LEFT FOOT: ICD-10-CM

## 2024-07-29 DIAGNOSIS — M79.675 TOE PAIN, LEFT: Primary | ICD-10-CM

## 2024-07-29 PROCEDURE — 99213 OFFICE O/P EST LOW 20 MIN: CPT | Performed by: PHYSICIAN ASSISTANT

## 2024-07-29 PROCEDURE — 3008F BODY MASS INDEX DOCD: CPT | Performed by: PHYSICIAN ASSISTANT

## 2024-07-29 PROCEDURE — 73630 X-RAY EXAM OF FOOT: CPT | Mod: LEFT SIDE | Performed by: ORTHOPAEDIC SURGERY

## 2024-07-29 PROCEDURE — 1160F RVW MEDS BY RX/DR IN RCRD: CPT | Performed by: PHYSICIAN ASSISTANT

## 2024-07-29 PROCEDURE — 73630 X-RAY EXAM OF FOOT: CPT | Mod: LT

## 2024-07-29 PROCEDURE — 1036F TOBACCO NON-USER: CPT | Performed by: PHYSICIAN ASSISTANT

## 2024-07-29 PROCEDURE — 1159F MED LIST DOCD IN RCRD: CPT | Performed by: PHYSICIAN ASSISTANT

## 2024-07-29 ASSESSMENT — PAIN SCALES - GENERAL
PAINLEVEL: 5
PAINLEVEL_OUTOF10: 5 - MODERATE PAIN

## 2024-07-29 ASSESSMENT — PAIN DESCRIPTION - DESCRIPTORS: DESCRIPTORS: DISCOMFORT

## 2024-07-29 ASSESSMENT — PAIN - FUNCTIONAL ASSESSMENT: PAIN_FUNCTIONAL_ASSESSMENT: 0-10

## 2024-07-29 NOTE — PATIENT INSTRUCTIONS
Thank you for coming to see us today!     Continue to use tylenol for pain control.   Rest, ice and elevate.    Follow up  6 weeks for re-xray

## 2024-07-29 NOTE — PROGRESS NOTES
Subjective      Chief Complaint   Patient presents with    Left Foot - Follow-up        HPI  This 68-year-old woman presents with left toe pain (3/10).  Several weeks ago she suffered a fall and noticed increased pain in her left small toe.  She denies hitting her head or loss of consciousness.  She states that she has been taping her left small and fourth toe together for support.  She is seen today wearing supportive shoes while walking.  She states that the left small toe pain is worse with and aggravated by prolonged walking.  She is able to resume most of her normal activities of daily living.  She presents today for re-xray and re-evaluation.     CARDIOLOGY:   Negative for chest pain, shortness of breath.   RESPIRATORY:   Negative for chest pain, shortness of breath.   MUSCULOSKELETAL:   See HPI for details.   NEUROLOGY:   Negative for tingling, numbness, weakness.    Objective    Vitals:    07/29/24 1105   Resp: 16       Physical Exam  GENERAL:          General Appearance:  This is a pleasant patient with appropriate affect, in no acute distress.   DERMATOLOGY:          Skin: skin at the neck, upper and lower back, and trunk is intact. There is no evidence of skin rash, skin breakdown or ulceration, or atrophic skin change.   EXTREMITIES:          Vascular:  Right, left hands and feet are warm with good color and pulses. Right and left calf and thigh are nontender and nonswollen.   NEUROLOGICAL:          Orientation:  Patient is alert and oriented to person, place, time and situation. Right and left upper and lower extremity motor and sensory examinations are intact.      MUSCULOSKELETAL: Neck: Nontender. No pain with range of motion.  Left foot: There is diffuse tenderness over the left small toe.  There is swelling present.  Skin is clean dry and intact.  There is pain with palpation over the proximal phalanx of the left small toe.  No pain with palpation over the dorsum of the left foot or along the left  fifth metatarsal.  Compartments are soft.  Neurovascular is intact.  The patient is seen today walking with the use of a tennis shoe with a stable gait.    AP and lateral x-rays of the left foot done in the office today show a nondisplaced fracture at the left fifth toe proximal phalanx. There is callus formation consistent with healing.     Johanny was seen today for follow-up.  Diagnoses and all orders for this visit:  Toe pain, left (Primary)  Swelling of toe of left foot  Fracture of proximal phalanx of toe of left foot  Options are discussed with the patient in detail. The patient is instructed to keep her left small toe taped to her left fourth toe for support and to wear tennis shoes at all times while up and walking for support.  The patient is instructed regarding activity modification and risk for further injury with falling or trauma, ice, provider directed at home gentle strengthening and ROM exercises, and the appropriate use of Tylenol as needed for pain with its potential adverse reactions and side effects. The patient understands.  Return in 6 weeks for jessica-ray or sooner as needed.  Please note that this report has been produced using speech recognition software.  It may contain errors related to grammar, punctuation or spelling.  Electronically signed, but not reviewed.    Claire Frankel PA-C

## 2024-08-02 ENCOUNTER — APPOINTMENT (OUTPATIENT)
Dept: PHYSICAL THERAPY | Facility: CLINIC | Age: 69
End: 2024-08-02
Payer: MEDICARE

## 2024-08-06 ENCOUNTER — APPOINTMENT (OUTPATIENT)
Dept: PHYSICAL THERAPY | Facility: CLINIC | Age: 69
End: 2024-08-06
Payer: MEDICARE

## 2024-08-12 ENCOUNTER — HOSPITAL ENCOUNTER (OUTPATIENT)
Dept: RADIOLOGY | Facility: EXTERNAL LOCATION | Age: 69
Discharge: HOME | End: 2024-08-12
Payer: MEDICARE
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
## 2024-08-13 ENCOUNTER — APPOINTMENT (OUTPATIENT)
Dept: PHYSICAL THERAPY | Facility: CLINIC | Age: 69
End: 2024-08-13
Payer: MEDICARE

## 2024-08-15 ENCOUNTER — EVALUATION (OUTPATIENT)
Dept: PHYSICAL THERAPY | Facility: CLINIC | Age: 69
End: 2024-08-15
Payer: MEDICARE

## 2024-08-15 ENCOUNTER — TREATMENT (OUTPATIENT)
Dept: PHYSICAL THERAPY | Facility: CLINIC | Age: 69
End: 2024-08-15
Payer: MEDICARE

## 2024-08-15 DIAGNOSIS — S82.001A UNSPECIFIED FRACTURE OF RIGHT PATELLA, INITIAL ENCOUNTER FOR CLOSED FRACTURE: ICD-10-CM

## 2024-08-15 DIAGNOSIS — S82.001A UNSPECIFIED FRACTURE OF RIGHT PATELLA, INITIAL ENCOUNTER FOR CLOSED FRACTURE: Primary | ICD-10-CM

## 2024-08-15 PROCEDURE — 97116 GAIT TRAINING THERAPY: CPT | Mod: GP

## 2024-08-16 NOTE — PROGRESS NOTES
Physical Therapy    Patient Name: Johanny Rosales  MRN: [id number]  Today's Date: 8/15/2024     Assessment/Plan    Pt presents to the clinic today requesting knee brace, R knee     Subjective   4/10 p!        Objective   Bruising noted along R Thigh, antalgic gait present          Ambulation/Gait Training:   Gait training provided after knee brace was provided. Pt reports decreased pain with knee brace      Assessment/Plan      No further PT needed at this time.

## 2024-08-27 ENCOUNTER — DOCUMENTATION (OUTPATIENT)
Dept: HOME HEALTH SERVICES | Facility: HOME HEALTH | Age: 69
End: 2024-08-27
Payer: MEDICARE

## 2024-08-27 ENCOUNTER — HOME HEALTH ADMISSION (OUTPATIENT)
Dept: HOME HEALTH SERVICES | Facility: HOME HEALTH | Age: 69
End: 2024-08-27
Payer: MEDICARE

## 2024-08-27 NOTE — HH CARE COORDINATION
Home Care received a Referral for Physical Therapy and Home Health Aide. We have processed the referral for a Start of Care on 8/29-8/30/2024 .     If you have any questions or concerns, please feel free to contact us at [phone]. Follow the prompts, enter your five digit zip code, and you will be directed to your care team on [facility].

## 2024-08-28 ENCOUNTER — APPOINTMENT (OUTPATIENT)
Dept: ORTHOPEDIC SURGERY | Facility: CLINIC | Age: 69
End: 2024-08-28
Payer: MEDICARE

## 2024-08-30 ENCOUNTER — HOME CARE VISIT (OUTPATIENT)
Dept: HOME HEALTH SERVICES | Facility: HOME HEALTH | Age: 69
End: 2024-08-30
Payer: MEDICARE

## 2024-08-30 VITALS
SYSTOLIC BLOOD PRESSURE: 144 MMHG | TEMPERATURE: 98.2 F | DIASTOLIC BLOOD PRESSURE: 73 MMHG | HEART RATE: 78 BPM | RESPIRATION RATE: 18 BRPM | OXYGEN SATURATION: 94 %

## 2024-08-30 PROCEDURE — 169592 NO-PAY CLAIM PROCEDURE

## 2024-08-30 PROCEDURE — G0151 HHCP-SERV OF PT,EA 15 MIN: HCPCS | Mod: HHH

## 2024-08-30 SDOH — HEALTH STABILITY: PHYSICAL HEALTH: EXERCISE TYPE: WRITTEN

## 2024-08-30 SDOH — HEALTH STABILITY: PHYSICAL HEALTH: PHYSICAL EXERCISE: 10

## 2024-08-30 ASSESSMENT — ENCOUNTER SYMPTOMS
PAIN LOCATION - PAIN FREQUENCY: INTERMITTENT
PAIN SEVERITY GOAL: 3/10
PAIN LOCATION - EXACERBATING FACTORS: MVMT
PAIN LOCATION: RIGHT KNEE
PAIN: 1
LOWEST PAIN SEVERITY IN PAST 24 HOURS: 5/10
PAIN LOCATION - PAIN DURATION: VARIES
PAIN LOCATION - RELIEVING FACTORS: REST
HIGHEST PAIN SEVERITY IN PAST 24 HOURS: 10/10
PAIN LOCATION - PAIN QUALITY: ACHING
PERSON REPORTING PAIN: PATIENT
PAIN LOCATION - PAIN SEVERITY: 6/10
SUBJECTIVE PAIN PROGRESSION: WAXING AND WANING
SHORTNESS OF BREATH: T
PAIN LOCATION - EXACERBATING FACTORS: MVMT
MUSCLE WEAKNESS: 1
PAIN LOCATION: RIGHT WRIST
PAIN LOCATION - RELIEVING FACTORS: REST
PAIN LOCATION - PAIN DURATION: VARIES
PAIN LOCATION - PAIN SEVERITY: 6/10
PAIN LOCATION - PAIN FREQUENCY: INTERMITTENT
PAIN LOCATION - PAIN QUALITY: ACHING
ARTHRALGIAS: 1

## 2024-08-30 ASSESSMENT — GAIT ASSESSMENTS
TRUNK: 1 - NO SWAY BUT FLEXION OF KNEES OR BACK OR SPREADS ARMS WHILE WALKING
STEP SYMMETRY: 0 - RIGHT AND LEFT STEP LENGTH NOT EQUAL
TRUNK SCORE: 1
STEP CONTINUITY: 0 - STOPPING OR DISCONTINUITY BETWEEN STEPS
INITIATION OF GAIT IMMEDIATELY AFTER GO: 0 - ANY HESITANCY OR MULTIPLE ATTEMPTS TO START
BALANCE AND GAIT SCORE: 16
PATH: 1 - MILD/MODERATE DEVIATION OR USES WALKING AID
WALKING STANCE: 0 - HEELS APART
PATH SCORE: 1
GAIT SCORE: 6

## 2024-08-30 ASSESSMENT — BALANCE ASSESSMENTS
ARISES: 1 - ABLE, USES ARMS TO HELP
NUDGED: 1 - STAGGERS, GRABS, CATCHES SELF
IMMEDIATE STANDING BALANCE FIRST 5 SECONDS: 1 - STEADY BUT USES WALKER OR OTHER SUPPORT
ATTEMPTS TO ARISE: 1 - ABLE, REQUIRES MORE THAN ONE ATTEMPT
NUDGED SCORE: 1
BALANCE SCORE: 10
SITTING BALANCE: 1 - STEADY, SAFE
ARISING SCORE: 1
TURNING 360 DEGREES STEPS: 1 - CONTINUOUS STEPS
SITTING DOWN: 1 - USES ARMS OR NOT SMOOTH MOTION
STANDING BALANCE: 1 - STEADY BUT WIDE STANCE AND USES CANE OR OTHER SUPPORT
EYES CLOSED AT MAXIMUM POSITION NUDGED: 1 - STEADY

## 2024-08-30 ASSESSMENT — ACTIVITIES OF DAILY LIVING (ADL)
PHYSICAL TRANSFERS ASSESSED: 1
ENTERING_EXITING_HOME: MINIMUM ASSIST
AMBULATION ASSISTANCE: 1
AMBULATION_DISTANCE/DURATION_TOLERATED: 50'
OASIS_M1830: 03
CURRENT_FUNCTION: STAND BY ASSIST
AMBULATION ASSISTANCE ON FLAT SURFACES: 1
AMBULATION ASSISTANCE: STAND BY ASSIST

## 2024-09-04 ENCOUNTER — HOME CARE VISIT (OUTPATIENT)
Dept: HOME HEALTH SERVICES | Facility: HOME HEALTH | Age: 69
End: 2024-09-04
Payer: MEDICARE

## 2024-09-04 VITALS
OXYGEN SATURATION: 95 % | RESPIRATION RATE: 18 BRPM | SYSTOLIC BLOOD PRESSURE: 152 MMHG | HEART RATE: 77 BPM | TEMPERATURE: 98.2 F | DIASTOLIC BLOOD PRESSURE: 85 MMHG

## 2024-09-04 PROCEDURE — G0151 HHCP-SERV OF PT,EA 15 MIN: HCPCS | Mod: HHH

## 2024-09-04 SDOH — HEALTH STABILITY: PHYSICAL HEALTH: EXERCISE TYPE: WRITTEN

## 2024-09-04 SDOH — HEALTH STABILITY: PHYSICAL HEALTH: PHYSICAL EXERCISE: 12

## 2024-09-04 ASSESSMENT — ENCOUNTER SYMPTOMS
PAIN LOCATION - PAIN QUALITY: ACHING
PAIN LOCATION - PAIN FREQUENCY: INTERMITTENT
PAIN LOCATION - RELIEVING FACTORS: REST
PAIN LOCATION - PAIN SEVERITY: 6/10
LIMITED RANGE OF MOTION: 1
MUSCLE WEAKNESS: 1
PAIN LOCATION: RIGHT KNEE
ARTHRALGIAS: 1
PERSON REPORTING PAIN: PATIENT
PAIN LOCATION - PAIN DURATION: VARIES
PAIN SEVERITY GOAL: 3/10
PAIN LOCATION - EXACERBATING FACTORS: MVMT
SUBJECTIVE PAIN PROGRESSION: WAXING AND WANING
PAIN: 1
LOWEST PAIN SEVERITY IN PAST 24 HOURS: 4/10
HIGHEST PAIN SEVERITY IN PAST 24 HOURS: 10/10

## 2024-09-04 ASSESSMENT — ACTIVITIES OF DAILY LIVING (ADL)
AMBULATION ASSISTANCE: STAND BY ASSIST
CURRENT_FUNCTION: STAND BY ASSIST
AMBULATION_DISTANCE/DURATION_TOLERATED: 75'
PHYSICAL TRANSFERS ASSESSED: 1
AMBULATION ASSISTANCE: 1

## 2024-09-07 ENCOUNTER — HOME CARE VISIT (OUTPATIENT)
Dept: HOME HEALTH SERVICES | Facility: HOME HEALTH | Age: 69
End: 2024-09-07
Payer: MEDICARE

## 2024-09-07 VITALS
RESPIRATION RATE: 18 BRPM | OXYGEN SATURATION: 95 % | SYSTOLIC BLOOD PRESSURE: 143 MMHG | DIASTOLIC BLOOD PRESSURE: 76 MMHG | HEART RATE: 76 BPM | TEMPERATURE: 98.1 F

## 2024-09-07 PROCEDURE — G0151 HHCP-SERV OF PT,EA 15 MIN: HCPCS | Mod: HHH

## 2024-09-07 SDOH — HEALTH STABILITY: PHYSICAL HEALTH: EXERCISE TYPE: WRITTEN

## 2024-09-07 SDOH — HEALTH STABILITY: PHYSICAL HEALTH: PHYSICAL EXERCISE: 15

## 2024-09-07 ASSESSMENT — ENCOUNTER SYMPTOMS
PAIN LOCATION - PAIN SEVERITY: 6/10
PERSON REPORTING PAIN: PATIENT
PAIN LOCATION - PAIN DURATION: VARIES
ARTHRALGIAS: 1
PAIN LOCATION - RELIEVING FACTORS: REST
MUSCLE WEAKNESS: 1
PAIN SEVERITY GOAL: 3/10
PAIN LOCATION: RIGHT KNEE
PAIN LOCATION - PAIN FREQUENCY: INTERMITTENT
LOWEST PAIN SEVERITY IN PAST 24 HOURS: 5/10
PAIN LOCATION - EXACERBATING FACTORS: MVMT
LIMITED RANGE OF MOTION: 1
PAIN LOCATION - PAIN QUALITY: ACHING
PAIN: 1
HIGHEST PAIN SEVERITY IN PAST 24 HOURS: 10/10
SUBJECTIVE PAIN PROGRESSION: GRADUALLY IMPROVING

## 2024-09-07 ASSESSMENT — ACTIVITIES OF DAILY LIVING (ADL)
PHYSICAL TRANSFERS ASSESSED: 1
CURRENT_FUNCTION: STAND BY ASSIST
AMBULATION ASSISTANCE: STAND BY ASSIST
AMBULATION ASSISTANCE: 1
AMBULATION ASSISTANCE ON FLAT SURFACES: 1
AMBULATION_DISTANCE/DURATION_TOLERATED: 100'

## 2024-09-10 ENCOUNTER — TELEPHONE (OUTPATIENT)
Dept: PRIMARY CARE | Facility: CLINIC | Age: 69
End: 2024-09-10

## 2024-09-10 ENCOUNTER — OFFICE VISIT (OUTPATIENT)
Dept: PRIMARY CARE | Facility: CLINIC | Age: 69
End: 2024-09-10
Payer: MEDICARE

## 2024-09-10 VITALS
SYSTOLIC BLOOD PRESSURE: 118 MMHG | HEART RATE: 79 BPM | DIASTOLIC BLOOD PRESSURE: 78 MMHG | TEMPERATURE: 97.1 F | OXYGEN SATURATION: 92 % | HEIGHT: 64 IN | WEIGHT: 215.4 LBS | BODY MASS INDEX: 36.77 KG/M2

## 2024-09-10 DIAGNOSIS — S82.041S CLOSED DISPLACED COMMINUTED FRACTURE OF RIGHT PATELLA, SEQUELA: Primary | ICD-10-CM

## 2024-09-10 DIAGNOSIS — Z23 ENCOUNTER FOR IMMUNIZATION: ICD-10-CM

## 2024-09-10 PROBLEM — K81.2 ACUTE ON CHRONIC CHOLECYSTITIS: Status: RESOLVED | Noted: 2023-10-19 | Resolved: 2024-09-10

## 2024-09-10 PROBLEM — R41.82 ACUTE ALTERATION IN MENTAL STATUS: Status: RESOLVED | Noted: 2023-10-19 | Resolved: 2024-09-10

## 2024-09-10 PROBLEM — E66.01 MORBID (SEVERE) OBESITY DUE TO EXCESS CALORIES (MULTI): Status: RESOLVED | Noted: 2023-12-04 | Resolved: 2024-09-10

## 2024-09-10 PROBLEM — R07.9 CHEST PAIN: Status: RESOLVED | Noted: 2023-10-19 | Resolved: 2024-09-10

## 2024-09-10 PROCEDURE — 99213 OFFICE O/P EST LOW 20 MIN: CPT | Performed by: FAMILY MEDICINE

## 2024-09-10 PROCEDURE — 1159F MED LIST DOCD IN RCRD: CPT | Performed by: FAMILY MEDICINE

## 2024-09-10 PROCEDURE — G0008 ADMIN INFLUENZA VIRUS VAC: HCPCS | Performed by: FAMILY MEDICINE

## 2024-09-10 PROCEDURE — 3008F BODY MASS INDEX DOCD: CPT | Performed by: FAMILY MEDICINE

## 2024-09-10 PROCEDURE — 1160F RVW MEDS BY RX/DR IN RCRD: CPT | Performed by: FAMILY MEDICINE

## 2024-09-10 PROCEDURE — 1125F AMNT PAIN NOTED PAIN PRSNT: CPT | Performed by: FAMILY MEDICINE

## 2024-09-10 RX ORDER — ACETAMINOPHEN AND CODEINE PHOSPHATE 300; 30 MG/1; MG/1
TABLET ORAL
COMMUNITY
Start: 2024-09-05

## 2024-09-10 RX ORDER — LIDOCAINE 50 MG/G
1 PATCH TOPICAL DAILY
Qty: 30 PATCH | Refills: 3 | Status: SHIPPED | OUTPATIENT
Start: 2024-09-10

## 2024-09-10 ASSESSMENT — PATIENT HEALTH QUESTIONNAIRE - PHQ9
4. FEELING TIRED OR HAVING LITTLE ENERGY: NEARLY EVERY DAY
10. IF YOU CHECKED OFF ANY PROBLEMS, HOW DIFFICULT HAVE THESE PROBLEMS MADE IT FOR YOU TO DO YOUR WORK, TAKE CARE OF THINGS AT HOME, OR GET ALONG WITH OTHER PEOPLE: VERY DIFFICULT
7. TROUBLE CONCENTRATING ON THINGS, SUCH AS READING THE NEWSPAPER OR WATCHING TELEVISION: NOT AT ALL
3. TROUBLE FALLING OR STAYING ASLEEP OR SLEEPING TOO MUCH: NEARLY EVERY DAY
6. FEELING BAD ABOUT YOURSELF - OR THAT YOU ARE A FAILURE OR HAVE LET YOURSELF OR YOUR FAMILY DOWN: SEVERAL DAYS
9. THOUGHTS THAT YOU WOULD BE BETTER OFF DEAD, OR OF HURTING YOURSELF: NOT AT ALL
5. POOR APPETITE OR OVEREATING: NEARLY EVERY DAY
SUM OF ALL RESPONSES TO PHQ QUESTIONS 1-9: 16
2. FEELING DOWN, DEPRESSED OR HOPELESS: NEARLY EVERY DAY
8. MOVING OR SPEAKING SO SLOWLY THAT OTHER PEOPLE COULD HAVE NOTICED. OR THE OPPOSITE, BEING SO FIGETY OR RESTLESS THAT YOU HAVE BEEN MOVING AROUND A LOT MORE THAN USUAL: NOT AT ALL
SUM OF ALL RESPONSES TO PHQ9 QUESTIONS 1 AND 2: 6
1. LITTLE INTEREST OR PLEASURE IN DOING THINGS: NEARLY EVERY DAY

## 2024-09-10 ASSESSMENT — LIFESTYLE VARIABLES: HOW MANY STANDARD DRINKS CONTAINING ALCOHOL DO YOU HAVE ON A TYPICAL DAY: PATIENT DOES NOT DRINK

## 2024-09-10 ASSESSMENT — ENCOUNTER SYMPTOMS
OCCASIONAL FEELINGS OF UNSTEADINESS: 0
LOSS OF SENSATION IN FEET: 0
DEPRESSION: 0

## 2024-09-10 ASSESSMENT — PAIN SCALES - GENERAL: PAINLEVEL: 8

## 2024-09-10 NOTE — PROGRESS NOTES
History Of Present Illness  Johanny Rosales is a 68 y.o. female presenting for Follow-up ([facility] follow up on right knee and right hand. Pt fx knee and hand.)  .    HPI     Fell a month ago when there was outage. Went to  and then saw Dr. Ponce. Wearing knee sleeve and wrist splint. Pain in knee still significant.    Has appt with Dr. Verduzco again today.      Past Medical History  Patient Active Problem List    Diagnosis Date Noted    Toe pain, left 07/01/2024    Swelling of toe of left foot 07/01/2024    Fracture of proximal phalanx of toe of left foot 07/01/2024    Chronic pain syndrome 12/04/2023    Diplopia 12/04/2023    Exotropia of right eye 12/04/2023    Frequency-urgency syndrome 12/04/2023    Heart failure (Multi) 12/04/2023    Hyperhidrosis 12/04/2023    Impairment of balance 12/04/2023    Incontinence of urine in female 12/04/2023    Obesity (BMI 35.0-39.9 without comorbidity) 12/04/2023    Recurrent falls 12/04/2023    Tinnitus 12/04/2023    Urgency incontinence 12/04/2023    Vitamin D deficiency 12/04/2023    Depression 12/04/2023    Inflammatory arthritis 12/04/2023    Alcohol abuse 12/04/2023    GERD (gastroesophageal reflux disease) 12/04/2023    Hyperlipidemia 12/04/2023    Mixed hyperlipidemia 12/04/2023    Abnormal gait 10/19/2023    Age-related nuclear cataract of left eye 10/19/2023    Anxiety 10/19/2023    Artificial lens present 10/19/2023    Atrophic vaginitis 10/19/2023    Bipolar 1 disorder (Multi) 10/19/2023    Carpal tunnel syndrome 10/19/2023    Cataract 10/19/2023    Anxiety and depression 04/19/2023    Other hyperlipidemia 04/19/2023    Gastroesophageal reflux disease without esophagitis 04/19/2023    Arthritis 04/19/2023    Cocaine use disorder, severe, in sustained remission (Multi) 01/27/2023    Moderate episode of recurrent major depressive disorder (Multi) 01/27/2023    Generalized anxiety disorder 01/27/2023    Cervicalgia 08/23/2011    Rotator cuff (capsule) sprain  08/23/2011    Seborrheic keratosis 02/18/2011    Benign neoplasm of colon 06/29/2009    Irritable bowel syndrome 06/29/2009    Tobacco use disorder 06/29/2009        Medications  Current Outpatient Medications   Medication Sig Dispense Refill    acetaminophen (Tylenol) 500 mg tablet Take 1 tablet (500 mg) by mouth every 8 hours if needed for mild pain (1 - 3).      acetaminophen-codeine (Tylenol w/ Codeine #3) 300-30 mg tablet       albuterol 90 mcg/actuation inhaler       buPROPion XL (Wellbutrin XL) 150 mg 24 hr tablet Take 1 tablet (150 mg) by mouth once daily. Do not crush, chew, or split.      gabapentin (Neurontin) 100 mg capsule Take 1 capsule (100 mg) by mouth 3 times a day.      hydrOXYzine HCL (Atarax) 25 mg tablet Take 1 tablet (25 mg) by mouth every 4 hours if needed.      ibuprofen 600 mg tablet Take 1 tablet (600 mg) by mouth every 6 hours if needed for mild pain (1 - 3).      mirtazapine (Remeron) 15 mg tablet Take 1 tablet (15 mg) by mouth once daily at bedtime.      multivitamin/iron/folic acid (CENTRUM COMPLETE ORAL) Take 1 tablet by mouth once daily. Indications: treatment to prevent vitamin deficiency      pantoprazole (Protonix) 40 mg EC tablet Take 1 tablet (40 mg) by mouth 2 times a day. 180 tablet 1    sertraline (Zoloft) 100 mg tablet Take 1 tablet (100 mg) by mouth twice a day.      simvastatin (Zocor) 20 mg tablet Take 1 tablet (20 mg) by mouth once daily at bedtime. 90 tablet 1    Stiolto Respimat 2.5-2.5 mcg/actuation mist inhaler Inhale 2 Inhalations once daily.      Wellbutrin  mg 24 hr tablet Take 1 tablet (300 mg) by mouth once every 24 hours.      lidocaine (Lidoderm) 5 % patch Place 1 patch over 12 hours on the skin once daily. Remove & discard patch within 12 hours or as directed by MD. 30 patch 3    traMADol (Ultram) 50 mg tablet Take 1 tablet (50 mg) by mouth every 6 hours if needed for moderate pain (4 - 6).       No current facility-administered medications for this  "visit.        Surgical History  She has a past surgical history that includes Cholecystectomy.     Social History  She reports that she has never smoked. She has never been exposed to tobacco smoke. She has never used smokeless tobacco. She reports that she does not currently use alcohol. She reports that she does not use drugs.    Family History  Family History   Problem Relation Name Age of Onset    Breast cancer Sister  70        Allergies  Patient has no known allergies.    Immunizations  Immunization History   Administered Date(s) Administered    Flu vaccine (IIV4), preservative free *Check age/dose* 09/27/2018    Flu vaccine, quadrivalent, high-dose, preservative free, age 65y+ (FLUZONE) 10/12/2020, 10/04/2021    Flu vaccine, trivalent, preservative free, HIGH-DOSE, age 65y+ (Fluzone) 09/10/2024    Influenza Whole 11/03/2008    Influenza, seasonal, injectable 10/11/2011, 08/27/2012, 11/11/2013, 10/08/2014    Moderna COVID-19 vaccine, bivalent, blue cap/gray label *Check age/dose* 11/04/2022    Moderna SARS-CoV-2 Vaccination 03/04/2021, 04/01/2021, 11/08/2021    Pneumococcal conjugate vaccine, 13-valent (PREVNAR 13) 08/16/2022    Pneumococcal polysaccharide vaccine, 23-valent, age 2 years and older (PNEUMOVAX 23) 09/19/2018    Tdap vaccine, age 7 year and older (BOOSTRIX, ADACEL) 06/29/2009, 04/25/2014    Zoster, live 08/07/2017        ROS  Negative, except as discussed in HPI     Vitals  /78   Pulse 79   Temp 36.2 °C (97.1 °F)   Ht 1.626 m (5' 4\")   Wt 97.7 kg (215 lb 6.4 oz)   LMP  (LMP Unknown)   SpO2 92% Comment: Pt states she has COPD  BMI 36.97 kg/m²      Physical Exam  Vitals and nursing note reviewed.   Constitutional:       General: She is not in acute distress.     Appearance: Normal appearance.   Cardiovascular:      Rate and Rhythm: Normal rate and regular rhythm.      Heart sounds: Normal heart sounds.   Pulmonary:      Effort: No respiratory distress.      Breath sounds: Normal breath " sounds.   Neurological:      General: No focal deficit present.      Mental Status: She is alert. Mental status is at baseline.         Relevant Results  Lab Results   Component Value Date    WBC 5.7 07/13/2023    WBC 5.9 04/10/2023    HGB 13.0 07/13/2023    HGB 13.5 04/10/2023    HCT 40.0 07/13/2023    HCT 40.4 04/10/2023    MCV 94 07/13/2023    MCV 93.1 04/10/2023     07/13/2023     04/10/2023     Lab Results   Component Value Date     07/13/2023     04/10/2023    K 4.6 07/13/2023    K 4.2 04/10/2023    CL 99 07/13/2023    CL 98 04/10/2023    CO2 29 07/13/2023    CO2 26 04/10/2023    BUN 9 07/13/2023    BUN 9 04/10/2023    CREATININE 0.63 07/13/2023    CREATININE 0.7 04/10/2023    CALCIUM 9.9 07/13/2023    CALCIUM 9.6 04/10/2023    PROT 7.6 07/13/2023    PROT 7.6 04/10/2023    BILITOT 0.7 07/13/2023    BILITOT 0.5 04/10/2023    ALKPHOS 111 07/13/2023    ALKPHOS 139 (H) 04/10/2023    ALT 73 (H) 07/13/2023    ALT 68 (H) 04/10/2023    AST 66 (H) 07/13/2023    AST 72 (H) 04/10/2023    GLUCOSE 100 (H) 07/13/2023    GLUCOSE 120 (H) 04/10/2023     Lab Results   Component Value Date    HGBA1C 4.6 04/10/2023     Lab Results   Component Value Date    TSH 2.33 07/13/2023    FREET4 0.9 10/16/2018      Lab Results   Component Value Date    CHOL 265 (H) 07/13/2023    TRIG 90 07/13/2023    .9 07/13/2023           Assessment/Plan   Johanny was seen today for follow-up.  Diagnoses and all orders for this visit:  Closed displaced comminuted fracture of right patella, sequela (Primary)  -     lidocaine (Lidoderm) 5 % patch; Place 1 patch over 12 hours on the skin once daily. Remove & discard patch within 12 hours or as directed by MD.  Encounter for immunization  -     Flu vaccine, trivalent, preservative free, HIGH-DOSE, age 65y+ (Fluzone)         Counseling:   Medication education:   -Education:  The patient is counseled regarding potential side-effects of any and all new  medications  -Understanding:  Patient expressed understanding of information discussed today  -Adherence:  No barriers to adherence identified    Final treatment plan is a result of shared decision making with patient.         Ravindra Miramontes MD

## 2024-09-10 NOTE — TELEPHONE ENCOUNTER
PT WAS PRESCRIBED LIDOCAINE PATCH FOR HER KNEE AND STATES HER INSURANCE IS REQUIRING PRE AUTHORIZATION

## 2024-09-11 ENCOUNTER — HOME CARE VISIT (OUTPATIENT)
Dept: HOME HEALTH SERVICES | Facility: HOME HEALTH | Age: 69
End: 2024-09-11
Payer: MEDICARE

## 2024-09-11 VITALS
TEMPERATURE: 97.6 F | SYSTOLIC BLOOD PRESSURE: 162 MMHG | OXYGEN SATURATION: 92 % | DIASTOLIC BLOOD PRESSURE: 75 MMHG | RESPIRATION RATE: 18 BRPM | HEART RATE: 81 BPM

## 2024-09-11 PROCEDURE — G0151 HHCP-SERV OF PT,EA 15 MIN: HCPCS | Mod: HHH

## 2024-09-11 SDOH — HEALTH STABILITY: PHYSICAL HEALTH: PHYSICAL EXERCISE: 17

## 2024-09-11 SDOH — HEALTH STABILITY: PHYSICAL HEALTH: EXERCISE TYPE: WRITTEN

## 2024-09-11 ASSESSMENT — ENCOUNTER SYMPTOMS
PERSON REPORTING PAIN: PATIENT
HIGHEST PAIN SEVERITY IN PAST 24 HOURS: 10/10
PAIN LOCATION: RIGHT KNEE
MUSCLE WEAKNESS: 1
PAIN SEVERITY GOAL: 3/10
PAIN LOCATION - EXACERBATING FACTORS: MVMT
PAIN LOCATION - RELIEVING FACTORS: REST
SUBJECTIVE PAIN PROGRESSION: WAXING AND WANING
ARTHRALGIAS: 1
PAIN LOCATION - PAIN QUALITY: ACHING
PAIN LOCATION - PAIN FREQUENCY: INTERMITTENT
PAIN: 1
PAIN LOCATION - PAIN SEVERITY: 5/10
LOWEST PAIN SEVERITY IN PAST 24 HOURS: 5/10
PAIN LOCATION - PAIN DURATION: VARIES

## 2024-09-11 ASSESSMENT — ACTIVITIES OF DAILY LIVING (ADL)
CURRENT_FUNCTION: STAND BY ASSIST
AMBULATION_DISTANCE/DURATION_TOLERATED: 150'
AMBULATION ASSISTANCE: STAND BY ASSIST
AMBULATION ASSISTANCE ON FLAT SURFACES: 1
AMBULATION ASSISTANCE: 1
PHYSICAL TRANSFERS ASSESSED: 1

## 2024-09-11 NOTE — TELEPHONE ENCOUNTER
PT CALLNG TO INFORM MD THAT THE ORDERED LIDOCAINE PATCHES WERE DENIED AND WILL BUY OVER THE COUNTER

## 2024-09-12 ENCOUNTER — APPOINTMENT (OUTPATIENT)
Dept: ORTHOPEDIC SURGERY | Facility: CLINIC | Age: 69
End: 2024-09-12
Payer: MEDICARE

## 2024-09-13 ENCOUNTER — TELEPHONE (OUTPATIENT)
Dept: PRIMARY CARE | Facility: CLINIC | Age: 69
End: 2024-09-13
Payer: MEDICARE

## 2024-09-13 DIAGNOSIS — G89.4 CHRONIC PAIN SYNDROME: Primary | ICD-10-CM

## 2024-09-13 DIAGNOSIS — J44.9 CHRONIC OBSTRUCTIVE PULMONARY DISEASE, UNSPECIFIED COPD TYPE (MULTI): ICD-10-CM

## 2024-09-13 NOTE — TELEPHONE ENCOUNTER
PT WAS SEEN LAST TUESDAY IS CALLING TO LET MD KNOW SHE WAS TAKEN OFF MEDICATION IBUPROFEN AND TYLENOL WITH CODEINE BY DR MCMAHAN PT IS REQUESTING A PAIN MANAGEMENT REFERRAL

## 2024-09-14 ENCOUNTER — HOME CARE VISIT (OUTPATIENT)
Dept: HOME HEALTH SERVICES | Facility: HOME HEALTH | Age: 69
End: 2024-09-14
Payer: MEDICARE

## 2024-09-14 VITALS
TEMPERATURE: 97.9 F | RESPIRATION RATE: 18 BRPM | SYSTOLIC BLOOD PRESSURE: 146 MMHG | HEART RATE: 78 BPM | OXYGEN SATURATION: 94 % | DIASTOLIC BLOOD PRESSURE: 78 MMHG

## 2024-09-14 PROCEDURE — G0151 HHCP-SERV OF PT,EA 15 MIN: HCPCS | Mod: HHH

## 2024-09-14 SDOH — HEALTH STABILITY: PHYSICAL HEALTH: PHYSICAL EXERCISE: 20

## 2024-09-14 SDOH — HEALTH STABILITY: PHYSICAL HEALTH: EXERCISE TYPE: WRITTEN

## 2024-09-14 ASSESSMENT — ENCOUNTER SYMPTOMS
PAIN LOCATION - PAIN FREQUENCY: INTERMITTENT
SUBJECTIVE PAIN PROGRESSION: RAPIDLY IMPROVING
MUSCLE WEAKNESS: 1
PAIN: 1
ARTHRALGIAS: 1
PAIN LOCATION - EXACERBATING FACTORS: MVMT
PAIN LOCATION - PAIN QUALITY: ACHING
PAIN LOCATION - PAIN DURATION: VARIES
PAIN SEVERITY GOAL: 3/10
HIGHEST PAIN SEVERITY IN PAST 24 HOURS: 10/10
PERSON REPORTING PAIN: PATIENT
PAIN LOCATION - PAIN SEVERITY: 6/10
PAIN LOCATION - RELIEVING FACTORS: REST
PAIN LOCATION: RIGHT KNEE
LOWEST PAIN SEVERITY IN PAST 24 HOURS: 3/10

## 2024-09-14 ASSESSMENT — ACTIVITIES OF DAILY LIVING (ADL)
AMBULATION ASSISTANCE ON FLAT SURFACES: 1
AMBULATION ASSISTANCE: 1
PHYSICAL TRANSFERS ASSESSED: 1
CURRENT_FUNCTION: STAND BY ASSIST
AMBULATION ASSISTANCE: STAND BY ASSIST
AMBULATION_DISTANCE/DURATION_TOLERATED: 150'

## 2024-09-16 ENCOUNTER — APPOINTMENT (OUTPATIENT)
Dept: ORTHOPEDIC SURGERY | Facility: CLINIC | Age: 69
End: 2024-09-16
Payer: MEDICARE

## 2024-09-18 ENCOUNTER — HOME CARE VISIT (OUTPATIENT)
Dept: HOME HEALTH SERVICES | Facility: HOME HEALTH | Age: 69
End: 2024-09-18
Payer: MEDICARE

## 2024-09-18 VITALS
DIASTOLIC BLOOD PRESSURE: 74 MMHG | RESPIRATION RATE: 18 BRPM | SYSTOLIC BLOOD PRESSURE: 152 MMHG | TEMPERATURE: 97.9 F | OXYGEN SATURATION: 94 % | HEART RATE: 77 BPM

## 2024-09-18 PROCEDURE — G0151 HHCP-SERV OF PT,EA 15 MIN: HCPCS | Mod: HHH

## 2024-09-18 SDOH — HEALTH STABILITY: PHYSICAL HEALTH: EXERCISE TYPE: WRITTEN

## 2024-09-18 SDOH — HEALTH STABILITY: PHYSICAL HEALTH: PHYSICAL EXERCISE: 22

## 2024-09-18 ASSESSMENT — ENCOUNTER SYMPTOMS
LOWEST PAIN SEVERITY IN PAST 24 HOURS: 5/10
PERSON REPORTING PAIN: PATIENT
PAIN LOCATION - PAIN SEVERITY: 5/10
ARTHRALGIAS: 1
MUSCLE WEAKNESS: 1
PAIN LOCATION - EXACERBATING FACTORS: MVMT
PAIN: 1
HIGHEST PAIN SEVERITY IN PAST 24 HOURS: 10/10
PAIN LOCATION: RIGHT KNEE
PAIN SEVERITY GOAL: 3/10
PAIN LOCATION - RELIEVING FACTORS: REST
PAIN LOCATION - PAIN FREQUENCY: INTERMITTENT
PAIN LOCATION - PAIN DURATION: VARIES
SUBJECTIVE PAIN PROGRESSION: GRADUALLY IMPROVING
PAIN LOCATION - PAIN QUALITY: ACHING

## 2024-09-18 ASSESSMENT — ACTIVITIES OF DAILY LIVING (ADL)
AMBULATION_DISTANCE/DURATION_TOLERATED: 175'
PHYSICAL TRANSFERS ASSESSED: 1
AMBULATION ASSISTANCE ON FLAT SURFACES: 1
AMBULATION ASSISTANCE: STAND BY ASSIST
CURRENT_FUNCTION: SUPERVISION
AMBULATION ASSISTANCE: 1

## 2024-09-21 ENCOUNTER — HOME CARE VISIT (OUTPATIENT)
Dept: HOME HEALTH SERVICES | Facility: HOME HEALTH | Age: 69
End: 2024-09-21
Payer: MEDICARE

## 2024-09-21 VITALS
TEMPERATURE: 98.2 F | OXYGEN SATURATION: 93 % | RESPIRATION RATE: 18 BRPM | SYSTOLIC BLOOD PRESSURE: 142 MMHG | HEART RATE: 81 BPM | DIASTOLIC BLOOD PRESSURE: 73 MMHG

## 2024-09-21 PROCEDURE — G0151 HHCP-SERV OF PT,EA 15 MIN: HCPCS | Mod: HHH

## 2024-09-21 SDOH — HEALTH STABILITY: PHYSICAL HEALTH: EXERCISE TYPE: WRITTEN

## 2024-09-21 SDOH — HEALTH STABILITY: PHYSICAL HEALTH: PHYSICAL EXERCISE: 25

## 2024-09-21 ASSESSMENT — ACTIVITIES OF DAILY LIVING (ADL)
PHYSICAL TRANSFERS ASSESSED: 1
AMBULATION_DISTANCE/DURATION_TOLERATED: 200'
AMBULATION ASSISTANCE: SUPERVISION
AMBULATION ASSISTANCE ON FLAT SURFACES: 1
AMBULATION ASSISTANCE: 1
CURRENT_FUNCTION: INDEPENDENT

## 2024-09-21 ASSESSMENT — ENCOUNTER SYMPTOMS
SUBJECTIVE PAIN PROGRESSION: GRADUALLY IMPROVING
PAIN SEVERITY GOAL: 3/10
PAIN LOCATION - PAIN FREQUENCY: INTERMITTENT
PAIN: 1
PAIN LOCATION: RIGHT KNEE
PAIN LOCATION - PAIN QUALITY: ACHING
PAIN LOCATION - EXACERBATING FACTORS: MVMT
MUSCLE WEAKNESS: 1
HIGHEST PAIN SEVERITY IN PAST 24 HOURS: 10/10
PERSON REPORTING PAIN: PATIENT
PAIN LOCATION - RELIEVING FACTORS: REST
ARTHRALGIAS: 1
LOWEST PAIN SEVERITY IN PAST 24 HOURS: 3/10
PAIN LOCATION - PAIN DURATION: VARIES
PAIN LOCATION - PAIN SEVERITY: 6/10
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
## 2024-09-24 ENCOUNTER — OFFICE VISIT (OUTPATIENT)
Dept: ORTHOPEDIC SURGERY | Facility: CLINIC | Age: 69
End: 2024-09-24
Payer: MEDICARE

## 2024-09-24 ENCOUNTER — APPOINTMENT (OUTPATIENT)
Dept: RADIOLOGY | Facility: CLINIC | Age: 69
End: 2024-09-24
Payer: MEDICARE

## 2024-09-24 ENCOUNTER — HOSPITAL ENCOUNTER (OUTPATIENT)
Dept: RADIOLOGY | Facility: CLINIC | Age: 69
Discharge: HOME | End: 2024-09-24
Payer: MEDICARE

## 2024-09-24 VITALS — WEIGHT: 215 LBS | BODY MASS INDEX: 36.7 KG/M2 | HEIGHT: 64 IN

## 2024-09-24 DIAGNOSIS — M79.675 TOE PAIN, LEFT: Primary | ICD-10-CM

## 2024-09-24 DIAGNOSIS — T14.8XXA FRACTURE: ICD-10-CM

## 2024-09-24 DIAGNOSIS — M79.89 SWELLING OF TOE OF LEFT FOOT: ICD-10-CM

## 2024-09-24 DIAGNOSIS — S92.912A FRACTURE OF PROXIMAL PHALANX OF TOE OF LEFT FOOT: ICD-10-CM

## 2024-09-24 PROCEDURE — 99213 OFFICE O/P EST LOW 20 MIN: CPT | Performed by: PHYSICIAN ASSISTANT

## 2024-09-24 PROCEDURE — 1160F RVW MEDS BY RX/DR IN RCRD: CPT | Performed by: PHYSICIAN ASSISTANT

## 2024-09-24 PROCEDURE — 1159F MED LIST DOCD IN RCRD: CPT | Performed by: PHYSICIAN ASSISTANT

## 2024-09-24 PROCEDURE — 3008F BODY MASS INDEX DOCD: CPT | Performed by: PHYSICIAN ASSISTANT

## 2024-09-24 PROCEDURE — 1036F TOBACCO NON-USER: CPT | Performed by: PHYSICIAN ASSISTANT

## 2024-09-24 PROCEDURE — 1125F AMNT PAIN NOTED PAIN PRSNT: CPT | Performed by: PHYSICIAN ASSISTANT

## 2024-09-24 PROCEDURE — 73630 X-RAY EXAM OF FOOT: CPT | Mod: LEFT SIDE | Performed by: ORTHOPAEDIC SURGERY

## 2024-09-24 PROCEDURE — 73630 X-RAY EXAM OF FOOT: CPT | Mod: LT

## 2024-09-24 ASSESSMENT — ENCOUNTER SYMPTOMS
DEPRESSION: 0
OCCASIONAL FEELINGS OF UNSTEADINESS: 1
LOSS OF SENSATION IN FEET: 0

## 2024-09-24 ASSESSMENT — LIFESTYLE VARIABLES
HOW OFTEN DURING THE LAST YEAR HAVE YOU HAD A FEELING OF GUILT OR REMORSE AFTER DRINKING: NEVER
HOW OFTEN DO YOU HAVE SIX OR MORE DRINKS ON ONE OCCASION: NEVER
HAS A RELATIVE, FRIEND, DOCTOR, OR ANOTHER HEALTH PROFESSIONAL EXPRESSED CONCERN ABOUT YOUR DRINKING OR SUGGESTED YOU CUT DOWN: NO
HOW OFTEN DURING THE LAST YEAR HAVE YOU BEEN UNABLE TO REMEMBER WHAT HAPPENED THE NIGHT BEFORE BECAUSE YOU HAD BEEN DRINKING: NEVER
SKIP TO QUESTIONS 9-10: 0
HOW OFTEN DURING THE LAST YEAR HAVE YOU FAILED TO DO WHAT WAS NORMALLY EXPECTED FROM YOU BECAUSE OF DRINKING: NEVER
HOW MANY STANDARD DRINKS CONTAINING ALCOHOL DO YOU HAVE ON A TYPICAL DAY: 3 OR 4
HOW OFTEN DO YOU HAVE A DRINK CONTAINING ALCOHOL: 2-3 TIMES A WEEK
HAVE YOU OR SOMEONE ELSE BEEN INJURED AS A RESULT OF YOUR DRINKING: NO
HOW OFTEN DURING THE LAST YEAR HAVE YOU FOUND THAT YOU WERE NOT ABLE TO STOP DRINKING ONCE YOU HAD STARTED: NEVER
AUDIT TOTAL SCORE: 4
HOW OFTEN DURING THE LAST YEAR HAVE YOU NEEDED AN ALCOHOLIC DRINK FIRST THING IN THE MORNING TO GET YOURSELF GOING AFTER A NIGHT OF HEAVY DRINKING: NEVER
AUDIT-C TOTAL SCORE: 4

## 2024-09-24 ASSESSMENT — PAIN SCALES - GENERAL
PAINLEVEL_OUTOF10: 5 - MODERATE PAIN
PAINLEVEL: 5

## 2024-09-24 ASSESSMENT — PAIN - FUNCTIONAL ASSESSMENT: PAIN_FUNCTIONAL_ASSESSMENT: 0-10

## 2024-09-24 ASSESSMENT — PATIENT HEALTH QUESTIONNAIRE - PHQ9
1. LITTLE INTEREST OR PLEASURE IN DOING THINGS: SEVERAL DAYS
2. FEELING DOWN, DEPRESSED OR HOPELESS: SEVERAL DAYS
SUM OF ALL RESPONSES TO PHQ9 QUESTIONS 1 AND 2: 2
10. IF YOU CHECKED OFF ANY PROBLEMS, HOW DIFFICULT HAVE THESE PROBLEMS MADE IT FOR YOU TO DO YOUR WORK, TAKE CARE OF THINGS AT HOME, OR GET ALONG WITH OTHER PEOPLE: SOMEWHAT DIFFICULT

## 2024-09-24 ASSESSMENT — COLUMBIA-SUICIDE SEVERITY RATING SCALE - C-SSRS
1. IN THE PAST MONTH, HAVE YOU WISHED YOU WERE DEAD OR WISHED YOU COULD GO TO SLEEP AND NOT WAKE UP?: NO
5. HAVE YOU STARTED TO WORK OUT OR WORKED OUT THE DETAILS OF HOW TO KILL YOURSELF? DO YOU INTEND TO CARRY OUT THIS PLAN?: NO
4. HAVE YOU HAD THESE THOUGHTS AND HAD SOME INTENTION OF ACTING ON THEM?: NO
6. HAVE YOU EVER DONE ANYTHING, STARTED TO DO ANYTHING, OR PREPARED TO DO ANYTHING TO END YOUR LIFE?: NO
2. HAVE YOU ACTUALLY HAD ANY THOUGHTS OF KILLING YOURSELF?: NO

## 2024-09-24 ASSESSMENT — PAIN DESCRIPTION - DESCRIPTORS: DESCRIPTORS: ACHING;STABBING;THROBBING

## 2024-09-25 ENCOUNTER — HOME CARE VISIT (OUTPATIENT)
Dept: HOME HEALTH SERVICES | Facility: HOME HEALTH | Age: 69
End: 2024-09-25
Payer: MEDICARE

## 2024-09-25 VITALS
RESPIRATION RATE: 18 BRPM | TEMPERATURE: 98.1 F | OXYGEN SATURATION: 94 % | HEART RATE: 76 BPM | SYSTOLIC BLOOD PRESSURE: 150 MMHG | DIASTOLIC BLOOD PRESSURE: 77 MMHG

## 2024-09-25 PROCEDURE — G0151 HHCP-SERV OF PT,EA 15 MIN: HCPCS | Mod: HHH

## 2024-09-25 SDOH — HEALTH STABILITY: PHYSICAL HEALTH: EXERCISE TYPE: WRITTEN

## 2024-09-25 SDOH — HEALTH STABILITY: PHYSICAL HEALTH: PHYSICAL EXERCISE: 27

## 2024-09-25 SDOH — HEALTH STABILITY: PHYSICAL HEALTH: EXERCISE COMMENTS: RODE STATIONARY BIKE X 2 MINS

## 2024-09-25 ASSESSMENT — ACTIVITIES OF DAILY LIVING (ADL)
PHYSICAL TRANSFERS ASSESSED: 1
AMBULATION ASSISTANCE: 1
CURRENT_FUNCTION: INDEPENDENT
AMBULATION ASSISTANCE: SUPERVISION
AMBULATION_DISTANCE/DURATION_TOLERATED: 250'
AMBULATION ASSISTANCE ON FLAT SURFACES: 1

## 2024-09-25 ASSESSMENT — ENCOUNTER SYMPTOMS
LOWEST PAIN SEVERITY IN PAST 24 HOURS: 0/10
PAIN LOCATION: RIGHT KNEE
PAIN LOCATION - EXACERBATING FACTORS: MVMT
PAIN LOCATION - PAIN DURATION: VARIES
ARTHRALGIAS: 1
PAIN LOCATION - PAIN FREQUENCY: INTERMITTENT
MUSCLE WEAKNESS: 1
PERSON REPORTING PAIN: PATIENT
PAIN SEVERITY GOAL: 2/10
PAIN LOCATION - PAIN QUALITY: ACHING
PAIN: 1
PAIN LOCATION - RELIEVING FACTORS: REST
PAIN LOCATION - PAIN SEVERITY: 3/10
SUBJECTIVE PAIN PROGRESSION: GRADUALLY IMPROVING
HIGHEST PAIN SEVERITY IN PAST 24 HOURS: 5/10

## 2024-09-28 ENCOUNTER — HOME CARE VISIT (OUTPATIENT)
Dept: HOME HEALTH SERVICES | Facility: HOME HEALTH | Age: 69
End: 2024-09-28
Payer: MEDICARE

## 2024-09-28 VITALS
HEART RATE: 82 BPM | DIASTOLIC BLOOD PRESSURE: 70 MMHG | RESPIRATION RATE: 18 BRPM | TEMPERATURE: 98.1 F | SYSTOLIC BLOOD PRESSURE: 151 MMHG | OXYGEN SATURATION: 94 %

## 2024-09-28 PROCEDURE — G0151 HHCP-SERV OF PT,EA 15 MIN: HCPCS | Mod: HHH

## 2024-09-28 SDOH — HEALTH STABILITY: PHYSICAL HEALTH: EXERCISE TYPE: INDEPENDENT

## 2024-09-28 ASSESSMENT — BALANCE ASSESSMENTS
SITTING BALANCE: 1 - STEADY, SAFE
ATTEMPTS TO ARISE: 2 - ABLE TO RISE, ONE ATTEMPT
TURNING 360 DEGREES STEPS: 1 - CONTINUOUS STEPS
EYES CLOSED AT MAXIMUM POSITION NUDGED: 1 - STEADY
ARISING SCORE: 2
NUDGED SCORE: 2
BALANCE SCORE: 15
SITTING DOWN: 1 - USES ARMS OR NOT SMOOTH MOTION
ARISES: 2 - ABLE WITHOUT USING ARMS
IMMEDIATE STANDING BALANCE FIRST 5 SECONDS: 2 - STEADY WITHOUT WALKER OR OTHER SUPPORT
NUDGED: 2 - STEADY
STANDING BALANCE: 2 - NARROW STANCE WITHOUT SUPPORT

## 2024-09-28 ASSESSMENT — ACTIVITIES OF DAILY LIVING (ADL)
PHYSICAL TRANSFERS ASSESSED: 1
AMBULATION ASSISTANCE: INDEPENDENT
HOME_HEALTH_OASIS: 00
OASIS_M1830: 00
CURRENT_FUNCTION: INDEPENDENT
AMBULATION ASSISTANCE: 1

## 2024-09-28 ASSESSMENT — ENCOUNTER SYMPTOMS
PAIN LOCATION - PAIN QUALITY: ACHING
PAIN LOCATION - EXACERBATING FACTORS: MVMT
LOWEST PAIN SEVERITY IN PAST 24 HOURS: 3/10
PAIN: 1
HIGHEST PAIN SEVERITY IN PAST 24 HOURS: 6/10
SUBJECTIVE PAIN PROGRESSION: GRADUALLY IMPROVING
PERSON REPORTING PAIN: PATIENT
PAIN LOCATION - RELIEVING FACTORS: REST
PAIN LOCATION: RIGHT KNEE
PAIN SEVERITY GOAL: 2/10
PAIN LOCATION - PAIN FREQUENCY: INTERMITTENT
PAIN LOCATION - PAIN DURATION: VARIES
PAIN LOCATION - PAIN SEVERITY: 5/10

## 2024-09-28 ASSESSMENT — GAIT ASSESSMENTS
PATH SCORE: 1
INITIATION OF GAIT IMMEDIATELY AFTER GO: 1 - NO HESITANCY
PATH: 1 - MILD/MODERATE DEVIATION OR USES WALKING AID
STEP SYMMETRY: 1 - RIGHT AND LEFT STEP LENGTH APPEAR EQUAL
STEP CONTINUITY: 1 - STEPS APPEAR CONTINUOUS
GAIT SCORE: 9
WALKING STANCE: 0 - HEELS APART
TRUNK SCORE: 1
TRUNK: 1 - NO SWAY BUT FLEXION OF KNEES OR BACK OR SPREADS ARMS WHILE WALKING
BALANCE AND GAIT SCORE: 24

## 2024-10-01 ENCOUNTER — LAB (OUTPATIENT)
Dept: LAB | Facility: LAB | Age: 69
End: 2024-10-01
Payer: MEDICARE

## 2024-10-01 DIAGNOSIS — Z00.00 ANNUAL PHYSICAL EXAM: ICD-10-CM

## 2024-10-01 DIAGNOSIS — E55.9 VITAMIN D DEFICIENCY: ICD-10-CM

## 2024-10-01 LAB
25(OH)D3 SERPL-MCNC: 60 NG/ML (ref 30–100)
ALBUMIN SERPL BCP-MCNC: 4.5 G/DL (ref 3.4–5)
ALP SERPL-CCNC: 118 U/L (ref 33–136)
ALT SERPL W P-5'-P-CCNC: 70 U/L (ref 7–45)
ANION GAP SERPL CALCULATED.3IONS-SCNC: 13 MMOL/L (ref 10–20)
AST SERPL W P-5'-P-CCNC: 85 U/L (ref 9–39)
BILIRUB SERPL-MCNC: 0.6 MG/DL (ref 0–1.2)
BUN SERPL-MCNC: 6 MG/DL (ref 6–23)
CALCIUM SERPL-MCNC: 9.2 MG/DL (ref 8.6–10.3)
CHLORIDE SERPL-SCNC: 98 MMOL/L (ref 98–107)
CHOLEST SERPL-MCNC: 257 MG/DL (ref 0–199)
CHOLEST/HDLC SERPL: 1.9 {RATIO}
CO2 SERPL-SCNC: 27 MMOL/L (ref 21–32)
CREAT SERPL-MCNC: 0.55 MG/DL (ref 0.5–1.05)
EGFRCR SERPLBLD CKD-EPI 2021: >90 ML/MIN/1.73M*2
EST. AVERAGE GLUCOSE BLD GHB EST-MCNC: 77 MG/DL
GLUCOSE SERPL-MCNC: 114 MG/DL (ref 74–99)
HBA1C MFR BLD: 4.3 %
HDLC SERPL-MCNC: 134.1 MG/DL
LDLC SERPL CALC-MCNC: 108 MG/DL
NON HDL CHOLESTEROL: 123 MG/DL (ref 0–149)
POTASSIUM SERPL-SCNC: 4.4 MMOL/L (ref 3.5–5.3)
PROT SERPL-MCNC: 6.8 G/DL (ref 6.4–8.2)
SODIUM SERPL-SCNC: 134 MMOL/L (ref 136–145)
TRIGL SERPL-MCNC: 76 MG/DL (ref 0–149)
TSH SERPL-ACNC: 3.82 MIU/L (ref 0.44–3.98)
VLDL: 15 MG/DL (ref 0–40)

## 2024-10-01 PROCEDURE — 80053 COMPREHEN METABOLIC PANEL: CPT

## 2024-10-01 PROCEDURE — 82306 VITAMIN D 25 HYDROXY: CPT

## 2024-10-01 PROCEDURE — 36415 COLL VENOUS BLD VENIPUNCTURE: CPT

## 2024-10-01 PROCEDURE — 84443 ASSAY THYROID STIM HORMONE: CPT

## 2024-10-01 PROCEDURE — 80061 LIPID PANEL: CPT

## 2024-10-01 PROCEDURE — 83036 HEMOGLOBIN GLYCOSYLATED A1C: CPT

## 2024-10-08 ENCOUNTER — EVALUATION (OUTPATIENT)
Dept: PHYSICAL THERAPY | Facility: CLINIC | Age: 69
End: 2024-10-08
Payer: MEDICARE

## 2024-10-08 DIAGNOSIS — S82.001A UNSPECIFIED FRACTURE OF RIGHT PATELLA, INITIAL ENCOUNTER FOR CLOSED FRACTURE: ICD-10-CM

## 2024-10-08 PROCEDURE — 97161 PT EVAL LOW COMPLEX 20 MIN: CPT | Mod: GP | Performed by: PHYSICAL THERAPIST

## 2024-10-08 ASSESSMENT — ENCOUNTER SYMPTOMS
DEPRESSION: 1
OCCASIONAL FEELINGS OF UNSTEADINESS: 1
LOSS OF SENSATION IN FEET: 0
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
## 2024-10-17 ENCOUNTER — TREATMENT (OUTPATIENT)
Dept: PHYSICAL THERAPY | Facility: CLINIC | Age: 69
End: 2024-10-17
Payer: MEDICARE

## 2024-10-17 DIAGNOSIS — S82.001A UNSPECIFIED FRACTURE OF RIGHT PATELLA, INITIAL ENCOUNTER FOR CLOSED FRACTURE: ICD-10-CM

## 2024-10-17 PROCEDURE — 97110 THERAPEUTIC EXERCISES: CPT | Mod: GP,CQ

## 2024-10-17 ASSESSMENT — PAIN DESCRIPTION - DESCRIPTORS: DESCRIPTORS: ACHING

## 2024-10-17 ASSESSMENT — PAIN SCALES - GENERAL: PAINLEVEL_OUTOF10: 2

## 2024-10-17 NOTE — PROGRESS NOTES
"Physical Therapy Treatment    Patient Name: Johanny Rosales  MRN: [id number]  Today's Date: 10/17/2024  Time Calculation  Start Time: 1015  Stop Time: 1100  Time Calculation (min): 45 min  PT Therapeutic Procedures Time Entry  Therapeutic Exercise Time Entry: 40    Insurance:  Visit number: 3 of 6  Authorization info: 1) EVAL ONLY - [facility] - AUTH THRU [employer] / $8550 OOP not met / 80% COVERAG. 2) [employer] Opt Out - 100% coverage / ds 10/7/24  Insurance Type: Payor: [employer]ARE / Plan: [employer] DUAL ADVANTAGE / Product Type: *No Product type* /     Current Problem   1. Unspecified fracture of right patella, initial encounter for closed fracture  Follow Up In Physical Therapy          Subjective   General   Reason for Referral: Physical Therapy Unspecified fracture of right patella, initial encounter for closed fracture  Referred By: Ramon Verduzco,  General Comment: Pt reports mild knee pain, tightness.  Precautions:  Precautions  Precautions Comment: None  Pain   0-10 (Numeric) Pain Score: 2  Pain Type: Chronic pain  Pain Location: Knee  Pain Orientation: Right  Pain Descriptors: Aching  Pain Frequency: Constant/continuous  Pain Onset: Ongoing  Post Treatment Pain Level 0/10    Objective   AROM  R knee 0-112 degrees.    Treatments:  Therapeutic Exercise:  Therapeutic Exercise  Therapeutic Exercise Performed: Yes  Therapeutic Exercise Activity 1: Nustep L5, 7'  Therapeutic Exercise Activity 2: Standing PF 2x10  Therapeutic Exercise Activity 3: Standing DF 2x10  Therapeutic Exercise Activity 4: Minisquats 2x10  Therapeutic Exercise Activity 5: LAQs 3# 2x10  Therapeutic Exercise Activity 6: Seated hamstring curls PTB 2x10  Therapeutic Exercise Activity 7: Hip adduction 5\" hold 2x10  Therapeutic Exercise Activity 8: Hip abduction with PTB 5\" hold 2x10  Therapeutic Exercise Activity 9: Heel slides 5\" hold 2x10     Assessment   Assessment:   PT Assessment  PT Assessment Results: Decreased strength, Decreased " range of motion, Impaired balance, Decreased endurance, Decreased mobility, Obesity, Pain  Rehab Prognosis: Good  Evaluation/Treatment Tolerance: Patient tolerated treatment well  Assessment Comment: Pt tolerates treatment with mild reduction in pain level, demonstrates improved R knee flexion as noted.    Plan:   OP PT Plan  Treatment/Interventions: Education/ Instruction, Gait training, Manual therapy, Neuromuscular re-education, Self care/ home management, Therapeutic activities, Therapeutic exercises, Taping techniques  PT Plan: Skilled PT  PT Frequency: 1 time per week  Duration: 5 more visits  Number of Treatments Authorized: EVAL only  Rehab Potential: Good  Plan of Care Agreement: Patient    OP EDUCATION:  Outpatient Education  Individual(s) Educated: Patient  Education Provided: Body Mechanics, Anatomy  Patient/Caregiver Demonstrated Understanding: yes  Patient Response to Education: Patient/Caregiver Verbalized Understanding of Information, Patient/Caregiver Performed Return Demonstration of Exercises/Activities, Patient/Caregiver Asked Appropriate Questions    Goals:   Active       Mobility       Goal 1       Start:  10/08/24    Expected End:  01/06/25       Pt will improve right LE strength to 4+/5 to improve I/ADLs.          Goal 2       Start:  10/08/24    Expected End:  01/06/25       Pt will improve right knee AROM to WNL to improve I/ADLs.            Pain       Goal 1       Start:  10/08/24    Expected End:  01/06/25       Pt will perform household tasks with 0/10 pain         Goal 2       Start:  10/08/24    Expected End:  01/06/25       Pt will stand/walk >25 min with 0/10 pain to improve I/ADLs and reduce risk of falls

## 2024-10-22 DIAGNOSIS — E78.49 OTHER HYPERLIPIDEMIA: ICD-10-CM

## 2024-10-22 RX ORDER — SIMVASTATIN 20 MG/1
20 TABLET, FILM COATED ORAL NIGHTLY
Qty: 90 TABLET | Refills: 1 | Status: SHIPPED | OUTPATIENT
Start: 2024-10-22 | End: 2025-10-22

## 2024-10-24 ENCOUNTER — TREATMENT (OUTPATIENT)
Dept: PHYSICAL THERAPY | Facility: CLINIC | Age: 69
End: 2024-10-24
Payer: MEDICARE

## 2024-10-24 DIAGNOSIS — S82.001A UNSPECIFIED FRACTURE OF RIGHT PATELLA, INITIAL ENCOUNTER FOR CLOSED FRACTURE: ICD-10-CM

## 2024-10-24 PROCEDURE — 97110 THERAPEUTIC EXERCISES: CPT | Mod: GP,CQ

## 2024-10-24 ASSESSMENT — PAIN SCALES - GENERAL: PAINLEVEL_OUTOF10: 6

## 2024-10-24 ASSESSMENT — PAIN DESCRIPTION - DESCRIPTORS: DESCRIPTORS: ACHING

## 2024-10-24 NOTE — PROGRESS NOTES
"Physical Therapy Treatment    Patient Name: Johanny Rosales  MRN: [id number]  Today's Date: 10/24/2024  Time Calculation  Start Time: 0845  Stop Time: 0930  Time Calculation (min): 45 min  PT Therapeutic Procedures Time Entry  Therapeutic Exercise Time Entry: 42    Insurance:  Visit number: 4 of 6  Authorization info: 1) EVAL ONLY - [facility] - AUTH THRU [employer] / $8550 OOP not met / 80% COVERAG. 2) [employer] Opt Out - 100% coverage / ds 10/7/24  Insurance Type: Payor: [employer] MEDICARE / Plan: [employer] DUAL ADVANTAGE / Product Type: *No Product type* /     Current Problem   1. Unspecified fracture of right patella, initial encounter for closed fracture  Follow Up In Physical Therapy          Subjective   General   Reason for Referral: Physical Therapy Unspecified fracture of right patella, initial encounter for closed fracture  Referred By: Ramon Verduzco,  General Comment: Pt reports increased pain level at R knee, Pt attributes this to changing weather. Pt states performing ADLs and homemaking activites are less painful overall.  Precautions:  Precautions  Precautions Comment: None  Pain   0-10 (Numeric) Pain Score: 6  Pain Type: Chronic pain  Pain Location: Knee  Pain Orientation: Right  Pain Descriptors: Aching  Pain Frequency: Constant/continuous  Pain Onset: Ongoing  Post Treatment Pain Level 3/10    Objective   AROM R knee 0-118 degrees after ther ex.    Treatments:  Therapeutic Exercise:  Therapeutic Exercise  Therapeutic Exercise Performed: Yes  Therapeutic Exercise Activity 1: Nustep L5, 7'  Therapeutic Exercise Activity 2: Standing PF 2x10  Therapeutic Exercise Activity 3: Standing DF 2x10  Therapeutic Exercise Activity 4: Minisquats 2x10  Therapeutic Exercise Activity 5: LAQs 4# 2x10  Therapeutic Exercise Activity 6: Hamstring curls 4# 2x10  Therapeutic Exercise Activity 7: Hip adduction 5\" hold 2x10  Therapeutic Exercise Activity 8: Hip abduction with PTB 5\" hold 2x10  Therapeutic Exercise Activity " "9: Quad set with towell support 2x10  Therapeutic Exercise Activity 10: Heel slides 5\" hold 2x10     Assessment   Assessment:   PT Assessment  PT Assessment Results: Decreased strength, Decreased range of motion, Impaired balance, Decreased endurance, Decreased mobility, Obesity, Pain  Rehab Prognosis: Good  Evaluation/Treatment Tolerance: Patient tolerated treatment well  Assessment Comment: Pt demonstrates improving R knee AROM as nbotede, decreased pain level after treatment.    Plan:   OP PT Plan  Treatment/Interventions: Education/ Instruction, Gait training, Manual therapy, Neuromuscular re-education, Self care/ home management, Therapeutic activities, Therapeutic exercises, Taping techniques  PT Plan: Skilled PT  PT Frequency: 1 time per week  Duration: 5 more visits  Number of Treatments Authorized: EVAL only  Rehab Potential: Good  Plan of Care Agreement: Patient    OP EDUCATION:  Outpatient Education  Individual(s) Educated: Patient  Education Provided: Anatomy, Body Mechanics  Patient/Caregiver Demonstrated Understanding: yes  Patient Response to Education: Patient/Caregiver Verbalized Understanding of Information, Patient/Caregiver Performed Return Demonstration of Exercises/Activities, Patient/Caregiver Asked Appropriate Questions    Goals:   Active       Mobility       Goal 1       Start:  10/08/24    Expected End:  01/06/25       Pt will improve right LE strength to 4+/5 to improve I/ADLs.          Goal 2       Start:  10/08/24    Expected End:  01/06/25       Pt will improve right knee AROM to WNL to improve I/ADLs.            Pain       Goal 1       Start:  10/08/24    Expected End:  01/06/25       Pt will perform household tasks with 0/10 pain         Goal 2       Start:  10/08/24    Expected End:  01/06/25       Pt will stand/walk >25 min with 0/10 pain to improve I/ADLs and reduce risk of falls                "

## 2024-10-31 ENCOUNTER — TREATMENT (OUTPATIENT)
Dept: PHYSICAL THERAPY | Facility: CLINIC | Age: 69
End: 2024-10-31
Payer: MEDICARE

## 2024-10-31 DIAGNOSIS — S82.001A UNSPECIFIED FRACTURE OF RIGHT PATELLA, INITIAL ENCOUNTER FOR CLOSED FRACTURE: ICD-10-CM

## 2024-10-31 PROCEDURE — 97110 THERAPEUTIC EXERCISES: CPT | Mod: GP | Performed by: PHYSICAL THERAPIST

## 2024-11-06 ENCOUNTER — APPOINTMENT (OUTPATIENT)
Dept: ORTHOPEDIC SURGERY | Facility: CLINIC | Age: 69
End: 2024-11-06
Payer: MEDICARE

## 2024-11-06 DIAGNOSIS — S92.912A FRACTURE OF PROXIMAL PHALANX OF TOE OF LEFT FOOT: ICD-10-CM

## 2024-11-06 DIAGNOSIS — M79.675 TOE PAIN, LEFT: ICD-10-CM

## 2024-11-06 DIAGNOSIS — M79.89 SWELLING OF TOE OF LEFT FOOT: ICD-10-CM

## 2024-11-07 ENCOUNTER — APPOINTMENT (OUTPATIENT)
Dept: PHYSICAL THERAPY | Facility: CLINIC | Age: 69
End: 2024-11-07
Payer: MEDICARE

## 2024-11-08 ENCOUNTER — APPOINTMENT (OUTPATIENT)
Dept: PRIMARY CARE | Facility: CLINIC | Age: 69
End: 2024-11-08
Payer: MEDICARE

## 2024-11-13 ENCOUNTER — TELEPHONE (OUTPATIENT)
Dept: PRIMARY CARE | Facility: CLINIC | Age: 69
End: 2024-11-13
Payer: MEDICARE

## 2024-11-14 ENCOUNTER — APPOINTMENT (OUTPATIENT)
Dept: PHYSICAL THERAPY | Facility: CLINIC | Age: 69
End: 2024-11-14
Payer: MEDICARE

## 2024-11-20 NOTE — TELEPHONE ENCOUNTER
Is the balance issue related to her knee/weakness or vertigo?    I placed referral for colonoscopy - just call to schedule

## 2024-11-20 NOTE — TELEPHONE ENCOUNTER
Patient advised. Declines scheduling number states she will call and schedule. Patient states she is just unsteady on her feet and is experiencing dizziness. Patient states this is not related to her knees.  States sometimes she doesn't have the strength to stay steady on her feet. Patient states she does have Meclizine to take when it gets really bad.

## 2024-11-21 NOTE — PROGRESS NOTES
[facility]  Outpatient Clinic Note    Chief Complaint   Patient presents with    Urine Leakage     Referring provider: No referring provider defined for this encounter.     Subjective   Johanny Rosales is a 69 y.o. female presenting for urinary incontinence.     History of Present Illness:  Patient known to Ashely Jameson CNP for urinary incontinence.  She was last seen 11/20/2023.  Patient had declined medical management of urinary incontinence and a medical supply order was placed for her urinary incontinence supplies.  Plan was for 1 year follow-up which patient presents for today.    Today she reports she is doing well. Presents to today's appointment to obtain documentation for medical/incontinence supplies. Patient reports mixed urinary incontinence.  Endorses SITA with cough, sneeze occasionally.  She endorses urgency and urge incontinence.  UUI occurs a few times per week.  She is unable to quantify her daytime frequency, states it depends on her fluid intake.  Nocturia 2 times per night.  Denies enuresis.  She is using 2 depends per day but thinks she needs to increase to 3/day.  She states she has tried Myrbetriq in the past but did not want to take a medication and felt she had side effects with this.  She is not currently taking an OAB medication.    She feels she is emptying her bladder well overall but does note at night she will void, go lay in bed and then feel urge to void again.    She denies vaginal prolapse symptoms and does not have any vaginal complaints.  She denies vaginal bleeding.    She denies dysuria, hematuria, fevers, chills, nausea, vomiting.  She is tolerating p.o. intake well.  She denies constipation or diarrhea.  She is scheduled for a colonoscopy.    Her Pmhx, surgical history, meds, allergies were reviewed and updated as needed.     Initial consult from Ashely Jameson 11/20/23:   68-year-old female presents as new patient for management of urinary incontinence,  kindly referred by Dr. Francisco. She has a history of anxiety, depression, HLD and alcoholism. Reports a 10-year history of incontinence, managed with briefs. She has tried oral medications in the past with side effects. She is not interested in medical or surgical therapies for symptoms. She denies any dysuria, gross hematuria, flank pain, fevers or chills.     Past Medical History and Surgical History:  Past Medical History:   Diagnosis Date    Anxiety     Arthritis     Depression     GERD (gastroesophageal reflux disease)        Past Surgical History:   Procedure Laterality Date    CHOLECYSTECTOMY         Medications  Current Outpatient Medications on File Prior to Visit   Medication Sig Dispense Refill    acetaminophen (Tylenol) 500 mg tablet Take 1 tablet (500 mg) by mouth every 8 hours if needed for mild pain (1 - 3).      acetaminophen-codeine (Tylenol w/ Codeine #3) 300-30 mg tablet       albuterol 90 mcg/actuation inhaler       buPROPion XL (Wellbutrin XL) 150 mg 24 hr tablet Take 1 tablet (150 mg) by mouth once daily. Do not crush, chew, or split.      gabapentin (Neurontin) 100 mg capsule Take 1 capsule (100 mg) by mouth 3 times a day.      hydrOXYzine HCL (Atarax) 25 mg tablet Take 1 tablet (25 mg) by mouth every 4 hours if needed.      ibuprofen 600 mg tablet Take 1 tablet (600 mg) by mouth every 6 hours if needed for mild pain (1 - 3).      lidocaine (Lidoderm) 5 % patch Place 1 patch over 12 hours on the skin once daily. Remove & discard patch within 12 hours or as directed by MD. 30 patch 3    mirtazapine (Remeron) 15 mg tablet Take 1 tablet (15 mg) by mouth once daily at bedtime.      multivitamin/iron/folic acid (CENTRUM COMPLETE ORAL) Take 1 tablet by mouth once daily. Indications: treatment to prevent vitamin deficiency      pantoprazole (Protonix) 40 mg EC tablet Take 1 tablet (40 mg) by mouth 2 times a day. 180 tablet 1    polyethylene glycol (GoLYTELY) 236-22.74-6.74 -5.86 gram solution Drink 1/2  "starting at 6 pm the night before your procedure then drink the 2nd 1/2 5 hours before procedure arrival time 4000 mL 0    sertraline (Zoloft) 100 mg tablet Take 1 tablet (100 mg) by mouth twice a day.      simvastatin (Zocor) 20 mg tablet Take 1 tablet (20 mg) by mouth once daily at bedtime. 90 tablet 1    Stiolto Respimat 2.5-2.5 mcg/actuation mist inhaler Inhale 2 Inhalations once daily.      traMADol (Ultram) 50 mg tablet Take 1 tablet (50 mg) by mouth every 6 hours if needed for moderate pain (4 - 6).      Wellbutrin  mg 24 hr tablet Take 1 tablet (300 mg) by mouth once every 24 hours.       No current facility-administered medications on file prior to visit.       Allergies:  No Known Allergies    Objective     Physicial Exam  Vitals:    11/25/24 1105   BP: 145/81   Pulse: 89   Temp: 35.8 °C (96.5 °F)     Body mass index is 39.18 kg/m².    General: Well developed, well nourished, alert and cooperative, appears in no acute distress  Eyes: Non-injected conjunctiva, sclera clear, no proptosis  Cardiac: Extremities are warm and well perfused. No edema, cyanosis or pallor.   Lungs: Breathing is easy, non-labored. Speaking in clear and complete sentences. Normal diaphragmatic movement.  MSK: Ambulatory with steady gait, unassisted  Neuro: alert and oriented to person, place and time  Psych: Demonstrates good judgement and reason, without hallucinations, abnormal affect or abnormal behaviors.  Skin: no obvious lesions, no rashes.    Patient unable to void in clinic.   PVR by US (1+ hour post void): 245mL     No results found for: \"URINECULTURE\"      Chemistry    Lab Results   Component Value Date/Time     (L) 10/01/2024 0726    K 4.4 10/01/2024 0726    CL 98 10/01/2024 0726    CO2 27 10/01/2024 0726    BUN 6 10/01/2024 0726    CREATININE 0.55 10/01/2024 0726    Lab Results   Component Value Date/Time    CALCIUM 9.2 10/01/2024 0726    ALKPHOS 118 10/01/2024 0726    AST 85 (H) 10/01/2024 0726    ALT 70 (H) " 10/01/2024 0726    BILITOT 0.6 10/01/2024 0726        Review of Systems  All other systems have been reviewed and are negative for complaint.    Assessment and Plan:  Johanny Rosales is a 69 y.o. female with mixed urinary incontinence.    > Urinary urgency, urge incontinence: We discussed management options including behavioral modifications, pelvic floor physical therapy, medications and procedural options.  Patient is not interested in medical management of the symptoms.  She is interested in pelvic floor physical therapy though.  Referral was placed and list of location was provided.    Her PVR somewhat elevated today, 245mL, although not a true PVR as patient voided about an hour ago.  We did discuss how this could impact her urinary symptoms.  I offered pelvic exam and straight catheterization to confirm PVR, send specimen for culture.  Patient was not interested in catheterization today.  Through shared decision making we agreed to perform behavioral modifications, attend pelvic floor physical therapy and for patient to return in 2 to 3 months for repeat PVR.  We discussed that if PVR remains elevated, would recommend straight cath to confirm and pelvic exam.  Given PVR is less than 300 mL and her October renal function was normal, no indication for acute management at this time.  Will continue to monitor.    > Stress urinary incontinence: Plan for PFPT    > Request for medical/incontinence supplies: Received request from [employer] where patient previously got her incontinence supplies.  She states she would like to use a different medical supply company so therefore this form was not completed. she is going to have the new company contact me, I provided her with my fax number today. She would like to use 3 depends/day.     All questions and concerns were addressed. Patient verbalizes understanding and has no other questions at this time.     Follow up: 2-3 months for PVR    Jasmin Arroyo  KELLY  11/25/24 11:48 AM  Clinic phone: [phone], [phone]

## 2024-11-22 DIAGNOSIS — D12.6 BENIGN NEOPLASM OF COLON, UNSPECIFIED PART OF COLON: ICD-10-CM

## 2024-11-22 RX ORDER — POLYETHYLENE GLYCOL 3350, SODIUM SULFATE ANHYDROUS, SODIUM BICARBONATE, SODIUM CHLORIDE, POTASSIUM CHLORIDE 236; 22.74; 6.74; 5.86; 2.97 G/4L; G/4L; G/4L; G/4L; G/4L
POWDER, FOR SOLUTION ORAL
Qty: 4000 ML | Refills: 0 | Status: SHIPPED | OUTPATIENT
Start: 2024-11-22

## 2024-11-25 ENCOUNTER — APPOINTMENT (OUTPATIENT)
Dept: UROLOGY | Facility: CLINIC | Age: 69
End: 2024-11-25
Payer: MEDICARE

## 2024-11-25 VITALS
DIASTOLIC BLOOD PRESSURE: 81 MMHG | HEIGHT: 62 IN | WEIGHT: 214.2 LBS | TEMPERATURE: 96.5 F | BODY MASS INDEX: 39.42 KG/M2 | SYSTOLIC BLOOD PRESSURE: 145 MMHG | HEART RATE: 89 BPM

## 2024-11-25 DIAGNOSIS — N39.498 OTHER URINARY INCONTINENCE: Primary | ICD-10-CM

## 2024-11-25 DIAGNOSIS — R33.9 INCOMPLETE BLADDER EMPTYING: ICD-10-CM

## 2024-11-25 DIAGNOSIS — R39.15 URINARY URGENCY: ICD-10-CM

## 2024-11-25 DIAGNOSIS — N39.46 MIXED STRESS AND URGE URINARY INCONTINENCE: ICD-10-CM

## 2024-11-25 PROCEDURE — 1160F RVW MEDS BY RX/DR IN RCRD: CPT | Performed by: PHYSICIAN ASSISTANT

## 2024-11-25 PROCEDURE — 99214 OFFICE O/P EST MOD 30 MIN: CPT | Performed by: PHYSICIAN ASSISTANT

## 2024-11-25 PROCEDURE — 1126F AMNT PAIN NOTED NONE PRSNT: CPT | Performed by: PHYSICIAN ASSISTANT

## 2024-11-25 PROCEDURE — 1036F TOBACCO NON-USER: CPT | Performed by: PHYSICIAN ASSISTANT

## 2024-11-25 PROCEDURE — 1159F MED LIST DOCD IN RCRD: CPT | Performed by: PHYSICIAN ASSISTANT

## 2024-11-25 PROCEDURE — 3008F BODY MASS INDEX DOCD: CPT | Performed by: PHYSICIAN ASSISTANT

## 2024-11-25 PROCEDURE — 51798 US URINE CAPACITY MEASURE: CPT | Performed by: PHYSICIAN ASSISTANT

## 2024-11-25 ASSESSMENT — PAIN SCALES - GENERAL: PAINLEVEL_OUTOF10: 0-NO PAIN

## 2024-11-25 NOTE — PATIENT INSTRUCTIONS
Pelvic Floor Rehabilitation  [facility] offers specialized care for patients with incontinence, pelvic pain and other issues that involve the pelvic floor. We are proud to serve all ages and genders. Our specialized program allows physical therapists to work alongside patients to:    Evaluate pelvic floor muscles  Formulate a personalized plan of care  Use the latest rehabilitation therapies to help patients achieve their goals.  What is the Pelvic Floor?  The pelvic floor is a bowl of muscle in the pelvis. This muscle group is important for our everyday activities. From a variety of causes, this muscle group can stop working well and present in multiple ways. Any person of any gender or any age, can have issues with the pelvic floor.    Pelvic floor physical therapy may be helpful if you have:    Pain with sitting  Incontinence (urinary or fecal)  Pain with sex  Pelvic pain  Lower abdominal pain  Genital pain  Constipation     General Scheduling Phone number: [phone]  For referral [phone]     Pelvic Floor Physical Therapy Locations:   [facility]  [address] [place], [place] [id number] [phone]    [facility]  [address] [place] [address]  [phone]    [facility]  [address] [facility] [address]  [phone]    [facility]  [address] [place] [address]  [phone]    [facility]  [address] [place] [address]  [phone]    [facility]  [address] [place] [address]  [phone]    [facility]  [address] [place] [address]  [phone]    [facility]  [address] [place], [place] [id number] [phone]    [facility]  [address] [place] [address]  [phone]    [facility]  [address] [place] [address]  [phone]    [facility]  [address] [place] [address]  [phone]     [facility]  [address] [place] [address]  [phone]    [facility]  [address] [place] [address]  [phone]    [facility] [place]  [address] [place] [address]  [phone]    [facility]  [address] [place], [place] [address] [phone]

## 2024-11-26 ENCOUNTER — TELEPHONE (OUTPATIENT)
Dept: UROLOGY | Facility: CLINIC | Age: 69
End: 2024-11-26
Payer: MEDICARE

## 2024-11-26 NOTE — TELEPHONE ENCOUNTER
Received message from patient she'd like incontinence supplies via [facility]. Spoke with patient, name and  verified. Confirmed she'd like supplies renewed via [facility]. She is requesting 3 pull ups per day, so 90 per month.     Spoke with member of [employer] who said can use current order form, just write new quantity. Patient is allowed up to 200 pull ups per month.    Order faxed today.     Jasmin Arroyo PA-C  24 12:14 PM  Clinic phone: [phone], [phone]

## 2024-12-02 ENCOUNTER — DOCUMENTATION (OUTPATIENT)
Dept: PHYSICAL THERAPY | Facility: CLINIC | Age: 69
End: 2024-12-02
Payer: MEDICARE

## 2024-12-02 NOTE — TELEPHONE ENCOUNTER
Spoke to Lalitha from [facility] regarding incontinence supply reorder that was previously faxed. Needed to provide last office visit date. Requested call back for verbal order/documentation of last visit. Spoke and reviewed last office visit was 11/25/24.     Jasmin Arroyo PA-C  12/02/24 8:32 AM  Clinic phone: [phone], [phone]

## 2024-12-02 NOTE — PROGRESS NOTES
Physical Therapy    Discharge Summary    Name: Johanny Rosales  MRN: [id number]  : 1955  Date: 24    Discharge Summary: PT    Discharge Information: Date of evaluation 10/08/2024    Therapy Summary: Pt with slow but good progress during therapy and met her goals. She has improved strength, however would benefit from further performance of HEP.    Discharge Status: cont with HEP on own     Rehab Discharge Reason: Achieved all and/or the most significant goals(s)

## 2024-12-31 ENCOUNTER — OFFICE VISIT (OUTPATIENT)
Dept: PRIMARY CARE | Facility: CLINIC | Age: 69
End: 2024-12-31
Payer: MEDICARE

## 2024-12-31 VITALS
TEMPERATURE: 97.4 F | DIASTOLIC BLOOD PRESSURE: 80 MMHG | HEART RATE: 80 BPM | SYSTOLIC BLOOD PRESSURE: 136 MMHG | BODY MASS INDEX: 37.61 KG/M2 | OXYGEN SATURATION: 93 % | WEIGHT: 204.4 LBS | HEIGHT: 62 IN

## 2024-12-31 DIAGNOSIS — R26.89 IMPAIRMENT OF BALANCE: Primary | ICD-10-CM

## 2024-12-31 DIAGNOSIS — K21.9 GASTROESOPHAGEAL REFLUX DISEASE WITHOUT ESOPHAGITIS: ICD-10-CM

## 2024-12-31 DIAGNOSIS — Z91.81 AT HIGH RISK FOR FALLS: ICD-10-CM

## 2024-12-31 PROBLEM — I50.9 HEART FAILURE: Status: RESOLVED | Noted: 2023-12-04 | Resolved: 2024-12-31

## 2024-12-31 PROBLEM — F10.90 ALCOHOL USE: Status: ACTIVE | Noted: 2023-12-04

## 2024-12-31 PROBLEM — M79.89 SWELLING OF TOE OF LEFT FOOT: Status: RESOLVED | Noted: 2024-07-01 | Resolved: 2024-12-31

## 2024-12-31 PROBLEM — S92.912A FRACTURE OF PROXIMAL PHALANX OF TOE OF LEFT FOOT: Status: RESOLVED | Noted: 2024-07-01 | Resolved: 2024-12-31

## 2024-12-31 PROBLEM — E78.2 MIXED HYPERLIPIDEMIA: Status: ACTIVE | Noted: 2023-04-19

## 2024-12-31 PROBLEM — F14.21 COCAINE USE DISORDER, SEVERE, IN SUSTAINED REMISSION (MULTI): Chronic | Status: RESOLVED | Noted: 2023-01-27 | Resolved: 2024-12-31

## 2024-12-31 PROBLEM — Z78.9 ALCOHOL USE: Status: ACTIVE | Noted: 2023-12-04

## 2024-12-31 PROBLEM — N95.2 ATROPHIC VAGINITIS: Status: RESOLVED | Noted: 2023-10-19 | Resolved: 2024-12-31

## 2024-12-31 PROBLEM — G56.00 CARPAL TUNNEL SYNDROME: Status: RESOLVED | Noted: 2023-10-19 | Resolved: 2024-12-31

## 2024-12-31 PROBLEM — M79.675 TOE PAIN, LEFT: Status: RESOLVED | Noted: 2024-07-01 | Resolved: 2024-12-31

## 2024-12-31 PROCEDURE — 1160F RVW MEDS BY RX/DR IN RCRD: CPT | Performed by: FAMILY MEDICINE

## 2024-12-31 PROCEDURE — 1126F AMNT PAIN NOTED NONE PRSNT: CPT | Performed by: FAMILY MEDICINE

## 2024-12-31 PROCEDURE — 1036F TOBACCO NON-USER: CPT | Performed by: FAMILY MEDICINE

## 2024-12-31 PROCEDURE — 99213 OFFICE O/P EST LOW 20 MIN: CPT | Performed by: FAMILY MEDICINE

## 2024-12-31 PROCEDURE — 1159F MED LIST DOCD IN RCRD: CPT | Performed by: FAMILY MEDICINE

## 2024-12-31 PROCEDURE — 3008F BODY MASS INDEX DOCD: CPT | Performed by: FAMILY MEDICINE

## 2024-12-31 RX ORDER — PANTOPRAZOLE SODIUM 40 MG/1
40 TABLET, DELAYED RELEASE ORAL 2 TIMES DAILY
Qty: 180 TABLET | Refills: 3 | Status: SHIPPED | OUTPATIENT
Start: 2024-12-31 | End: 2025-12-31

## 2024-12-31 ASSESSMENT — LIFESTYLE VARIABLES
SKIP TO QUESTIONS 9-10: 1
HOW OFTEN DO YOU HAVE A DRINK CONTAINING ALCOHOL: MONTHLY OR LESS
AUDIT-C TOTAL SCORE: 1
HOW MANY STANDARD DRINKS CONTAINING ALCOHOL DO YOU HAVE ON A TYPICAL DAY: 1 OR 2
HOW OFTEN DO YOU HAVE SIX OR MORE DRINKS ON ONE OCCASION: NEVER

## 2024-12-31 ASSESSMENT — ENCOUNTER SYMPTOMS
DEPRESSION: 1
LOSS OF SENSATION IN FEET: 0
OCCASIONAL FEELINGS OF UNSTEADINESS: 1

## 2024-12-31 ASSESSMENT — PATIENT HEALTH QUESTIONNAIRE - PHQ9
1. LITTLE INTEREST OR PLEASURE IN DOING THINGS: NOT AT ALL
2. FEELING DOWN, DEPRESSED OR HOPELESS: NOT AT ALL
SUM OF ALL RESPONSES TO PHQ9 QUESTIONS 1 AND 2: 0

## 2024-12-31 ASSESSMENT — PAIN SCALES - GENERAL: PAINLEVEL_OUTOF10: 0-NO PAIN

## 2024-12-31 NOTE — PROGRESS NOTES
History Of Present Illness  Johanny Rosales is a 69 y.o. female presenting for Unsteady gait; would like rx for an walker.  .    HPI     Still unstable with walking. Had a recent fall at home walking in hallway of apartment. No head trauma or LOC. Dnies any current pain.   Needs new walker  Still struggling with mood since nephew . Is seeing psych  Chronic GERD, needs refill of PPI twice daily. Tried cutting down to once daily but symptoms flared. Has ucoming appt with GI for screening colonoscopy.    Past Medical History  Patient Active Problem List    Diagnosis Date Noted    Chronic pain syndrome 2023    Diplopia 2023    Exotropia of right eye 2023    Frequency-urgency syndrome 2023    Hyperhidrosis 2023    Impairment of balance 2023    Incontinence of urine in female 2023    Obesity (BMI 35.0-39.9 without comorbidity) 2023    Recurrent falls 2023    Tinnitus 2023    Urgency incontinence 2023    Vitamin D deficiency 2023    Depression 2023    Inflammatory arthritis 2023    Alcohol use 2023    Abnormal gait 10/19/2023    Age-related nuclear cataract of left eye 10/19/2023    Anxiety 10/19/2023    Artificial lens present 10/19/2023    Bipolar 1 disorder (Multi) 10/19/2023    Cataract 10/19/2023    Anxiety and depression 2023    Arthritis 2023    GERD (gastroesophageal reflux disease) 2023    Mixed hyperlipidemia 2023    Moderate episode of recurrent major depressive disorder 2023    Generalized anxiety disorder 2023    Cervicalgia 2011    Seborrheic keratosis 2011    Benign neoplasm of colon 2009    Tobacco use disorder 2009        Medications  Current Outpatient Medications   Medication Sig Dispense Refill    acetaminophen (Tylenol) 500 mg tablet Take 1 tablet (500 mg) by mouth every 8 hours if needed for mild pain (1 - 3).      albuterol 90 mcg/actuation inhaler        buPROPion XL (Wellbutrin XL) 150 mg 24 hr tablet Take 1 tablet (150 mg) by mouth once daily. Do not crush, chew, or split.      gabapentin (Neurontin) 100 mg capsule Take 1 capsule (100 mg) by mouth 3 times a day.      hydrOXYzine HCL (Atarax) 25 mg tablet Take 1 tablet (25 mg) by mouth every 4 hours if needed.      ibuprofen 600 mg tablet Take 1 tablet (600 mg) by mouth every 6 hours if needed for mild pain (1 - 3).      mirtazapine (Remeron) 15 mg tablet Take 1 tablet (15 mg) by mouth once daily at bedtime.      multivitamin/iron/folic acid (CENTRUM COMPLETE ORAL) Take 1 tablet by mouth once daily. Indications: treatment to prevent vitamin deficiency      polyethylene glycol (GoLYTELY) 236-22.74-6.74 -5.86 gram solution Drink 1/2 starting at 6 pm the night before your procedure then drink the 2nd 1/2 5 hours before procedure arrival time 4000 mL 0    sertraline (Zoloft) 100 mg tablet Take 1 tablet (100 mg) by mouth twice a day.      simvastatin (Zocor) 20 mg tablet Take 1 tablet (20 mg) by mouth once daily at bedtime. 90 tablet 1    Stiolto Respimat 2.5-2.5 mcg/actuation mist inhaler Inhale 2 Inhalations once daily.      Wellbutrin  mg 24 hr tablet Take 1 tablet (300 mg) by mouth once every 24 hours.      pantoprazole (Protonix) 40 mg EC tablet Take 1 tablet (40 mg) by mouth 2 times a day. 180 tablet 3     No current facility-administered medications for this visit.        Surgical History  She has a past surgical history that includes Cholecystectomy.     Social History  She reports that she has never smoked. She has never been exposed to tobacco smoke. She has never used smokeless tobacco. She reports current alcohol use. She reports that she does not use drugs.    Family History  Family History   Problem Relation Name Age of Onset    Breast cancer Sister  70        Allergies  Patient has no known allergies.    Immunizations  Immunization History   Administered Date(s) Administered    Flu vaccine  "(IIV4), preservative free *Check age/dose* 09/27/2018    Flu vaccine, quadrivalent, high-dose, preservative free, age 65y+ (FLUZONE) 10/12/2020, 10/04/2021    Flu vaccine, trivalent, preservative free, HIGH-DOSE, age 65y+ (Fluzone) 09/10/2024    Influenza Whole 11/03/2008    Influenza, seasonal, injectable 10/11/2011, 08/27/2012, 11/11/2013, 10/08/2014    Moderna COVID-19 vaccine, bivalent, blue cap/gray label *Check age/dose* 11/04/2022    Moderna SARS-CoV-2 Vaccination 03/04/2021, 04/01/2021, 11/08/2021    Pneumococcal conjugate vaccine, 13-valent (PREVNAR 13) 08/16/2022    Pneumococcal polysaccharide vaccine, 23-valent, age 2 years and older (PNEUMOVAX 23) 09/19/2018    Tdap vaccine, age 7 year and older (BOOSTRIX, ADACEL) 06/29/2009, 04/25/2014    Zoster, live 08/07/2017        ROS  Negative, except as discussed in HPI     Vitals  /80   Pulse 80   Temp 36.3 °C (97.4 °F)   Ht 1.575 m (5' 2\")   Wt 92.7 kg (204 lb 6.4 oz)   LMP  (LMP Unknown)   SpO2 93%   BMI 37.39 kg/m²      Physical Exam  Vitals and nursing note reviewed.   Constitutional:       General: She is not in acute distress.     Appearance: Normal appearance.   Cardiovascular:      Rate and Rhythm: Normal rate and regular rhythm.      Heart sounds: Normal heart sounds.   Pulmonary:      Effort: No respiratory distress.      Breath sounds: Normal breath sounds.   Neurological:      General: No focal deficit present.      Mental Status: She is alert. Mental status is at baseline.         Relevant Results  Lab Results   Component Value Date    WBC 5.7 07/13/2023    WBC 5.9 04/10/2023    HGB 13.0 07/13/2023    HGB 13.5 04/10/2023    HCT 40.0 07/13/2023    HCT 40.4 04/10/2023    MCV 94 07/13/2023    MCV 93.1 04/10/2023     07/13/2023     04/10/2023     Lab Results   Component Value Date     (L) 10/01/2024     07/13/2023    K 4.4 10/01/2024    K 4.6 07/13/2023    CL 98 10/01/2024    CL 99 07/13/2023    CO2 27 10/01/2024 "    CO2 29 07/13/2023    BUN 6 10/01/2024    BUN 9 07/13/2023    CREATININE 0.55 10/01/2024    CREATININE 0.63 07/13/2023    CALCIUM 9.2 10/01/2024    CALCIUM 9.9 07/13/2023    PROT 6.8 10/01/2024    PROT 7.6 07/13/2023    BILITOT 0.6 10/01/2024    BILITOT 0.7 07/13/2023    ALKPHOS 118 10/01/2024    ALKPHOS 111 07/13/2023    ALT 70 (H) 10/01/2024    ALT 73 (H) 07/13/2023    AST 85 (H) 10/01/2024    AST 66 (H) 07/13/2023    GLUCOSE 114 (H) 10/01/2024    GLUCOSE 100 (H) 07/13/2023     Lab Results   Component Value Date    HGBA1C 4.3 10/01/2024     Lab Results   Component Value Date    TSH 3.82 10/01/2024    FREET4 0.9 10/16/2018      Lab Results   Component Value Date    CHOL 257 (H) 10/01/2024    TRIG 76 10/01/2024    .1 10/01/2024           Assessment/Plan   Johanny was seen today for unsteady gait; would like rx for an walker..  Diagnoses and all orders for this visit:  Impairment of balance (Primary)  -     Walker standard  Gastroesophageal reflux disease without esophagitis  Comments:  failed trial to reduce PPI dose from BID to once daily  Orders:  -     pantoprazole (Protonix) 40 mg EC tablet; Take 1 tablet (40 mg) by mouth 2 times a day.  At high risk for falls  Comments:  medically necessary for her to have walker to prevent falls, morbidity and mortality         Counseling:   Medication education:   -Education:  The patient is counseled regarding potential side-effects of any and all new medications  -Understanding:  Patient expressed understanding of information discussed today  -Adherence:  No barriers to adherence identified    Final treatment plan is a result of shared decision making with patient.         Ravindra Miramontes MD

## 2024-12-31 NOTE — PATIENT INSTRUCTIONS
-Call to schedule with physical therapy for pelvic floor therapy  -Do bladder training in meantime

## 2025-01-08 ENCOUNTER — APPOINTMENT (OUTPATIENT)
Dept: GASTROENTEROLOGY | Facility: EXTERNAL LOCATION | Age: 70
End: 2025-01-08
Payer: MEDICARE

## 2025-01-08 ENCOUNTER — TELEPHONE (OUTPATIENT)
Dept: PRIMARY CARE | Facility: CLINIC | Age: 70
End: 2025-01-08

## 2025-01-08 DIAGNOSIS — R35.0 URINARY FREQUENCY: Primary | ICD-10-CM

## 2025-01-08 DIAGNOSIS — S82.041S CLOSED DISPLACED COMMINUTED FRACTURE OF RIGHT PATELLA, SEQUELA: ICD-10-CM

## 2025-01-08 NOTE — TELEPHONE ENCOUNTER
PT CALLING REQUESTING REFERRAL FOR UROLOGY HAVING PROBLEMS WITH INCONTINENCE PT WOULD LIKE HELP FINDING ONE LOCALLY

## 2025-01-13 NOTE — TELEPHONE ENCOUNTER
PT REQUESTING A DIFFERENT ORDER FOR A WALKER WITH 2 WHEELS ON THE FRONT.  STATES THE STANDARD COMES WITHOUT WHEELS AND THAT IS WHAT SHE GOT.  STATES FAX TO [facility] FAX # [phone]

## 2025-01-14 ENCOUNTER — APPOINTMENT (OUTPATIENT)
Dept: PHYSICAL THERAPY | Facility: CLINIC | Age: 70
End: 2025-01-14
Payer: MEDICARE

## 2025-01-21 ENCOUNTER — APPOINTMENT (OUTPATIENT)
Dept: OTOLARYNGOLOGY | Facility: CLINIC | Age: 70
End: 2025-01-21
Payer: MEDICARE

## 2025-01-21 ENCOUNTER — APPOINTMENT (OUTPATIENT)
Dept: AUDIOLOGY | Facility: CLINIC | Age: 70
End: 2025-01-21
Payer: MEDICARE

## 2025-01-23 ENCOUNTER — APPOINTMENT (OUTPATIENT)
Dept: PHYSICAL THERAPY | Facility: CLINIC | Age: 70
End: 2025-01-23
Payer: MEDICARE

## 2025-02-04 ENCOUNTER — APPOINTMENT (OUTPATIENT)
Dept: PHYSICAL THERAPY | Facility: CLINIC | Age: 70
End: 2025-02-04
Payer: MEDICARE

## 2025-02-06 ENCOUNTER — APPOINTMENT (OUTPATIENT)
Dept: ORTHOPEDIC SURGERY | Facility: CLINIC | Age: 70
End: 2025-02-06
Payer: MEDICARE

## 2025-02-13 ENCOUNTER — APPOINTMENT (OUTPATIENT)
Dept: PHYSICAL THERAPY | Facility: CLINIC | Age: 70
End: 2025-02-13
Payer: MEDICARE

## 2025-02-20 ENCOUNTER — APPOINTMENT (OUTPATIENT)
Dept: ORTHOPEDIC SURGERY | Facility: CLINIC | Age: 70
End: 2025-02-20
Payer: MEDICARE

## 2025-02-25 ENCOUNTER — CLINICAL SUPPORT (OUTPATIENT)
Dept: AUDIOLOGY | Facility: CLINIC | Age: 70
End: 2025-02-25
Payer: MEDICARE

## 2025-02-25 ENCOUNTER — OFFICE VISIT (OUTPATIENT)
Dept: OTOLARYNGOLOGY | Facility: CLINIC | Age: 70
End: 2025-02-25
Payer: MEDICARE

## 2025-02-25 ENCOUNTER — APPOINTMENT (OUTPATIENT)
Dept: UROLOGY | Facility: CLINIC | Age: 70
End: 2025-02-25
Payer: MEDICARE

## 2025-02-25 VITALS — HEIGHT: 62 IN | WEIGHT: 204 LBS | BODY MASS INDEX: 37.54 KG/M2

## 2025-02-25 DIAGNOSIS — H90.3 ASYMMETRICAL SENSORINEURAL HEARING LOSS: Primary | ICD-10-CM

## 2025-02-25 DIAGNOSIS — H93.13 TINNITUS OF BOTH EARS: ICD-10-CM

## 2025-02-25 DIAGNOSIS — H90.3 ASYMMETRIC SENSORINEURAL DEAFNESS: Primary | ICD-10-CM

## 2025-02-25 DIAGNOSIS — H90.3 SENSORINEURAL HEARING LOSS (SNHL) OF BOTH EARS: ICD-10-CM

## 2025-02-25 PROCEDURE — 92557 COMPREHENSIVE HEARING TEST: CPT | Performed by: AUDIOLOGIST

## 2025-02-25 PROCEDURE — 92567 TYMPANOMETRY: CPT | Performed by: AUDIOLOGIST

## 2025-02-25 PROCEDURE — 1159F MED LIST DOCD IN RCRD: CPT | Performed by: OTOLARYNGOLOGY

## 2025-02-25 PROCEDURE — 3008F BODY MASS INDEX DOCD: CPT | Performed by: OTOLARYNGOLOGY

## 2025-02-25 PROCEDURE — 1036F TOBACCO NON-USER: CPT | Performed by: OTOLARYNGOLOGY

## 2025-02-25 PROCEDURE — 99204 OFFICE O/P NEW MOD 45 MIN: CPT | Performed by: OTOLARYNGOLOGY

## 2025-02-25 NOTE — PROGRESS NOTES
Chief Complaint   Patient presents with    Cerumen Impaction     LOV: 5/2017 W/ RICKEY, HEARING CHECK HAD AUDIO DONE      Date of Evaluation: 2/25/2025   HPI  Johanny Rosales is a 69 y.o. female here for evaluation of hearing loss.  She saw Dr. Diamond in 2017 for dizziness and asymmetric sensorineural hearing loss.  She is having some difficulty hearing particularly in background noise.  Her updated audiogram shows a continued asymmetric right worse than left hearing loss.  Her hearing loss has progressed bilaterally over the past 8 years with a drop at 2000 Hz from what was 20 dB now up to 30 and 40 dB.  I do believe this is impacting on her speech awareness.  Her word recognition is excellent.  Her tympanogram is normal       Past Medical History:   Diagnosis Date    Anxiety     Arthritis     Atrophic vaginitis 10/19/2023    Carpal tunnel syndrome 10/19/2023    Cocaine use disorder, severe, in sustained remission (Multi) 01/27/2023    Depression     Fracture of proximal phalanx of toe of left foot 07/01/2024    GERD (gastroesophageal reflux disease)     Heart failure 12/04/2023    Irritable bowel syndrome 06/29/2009    Rotator cuff (capsule) sprain 08/23/2011    Swelling of toe of left foot 07/01/2024    Toe pain, left 07/01/2024      Past Surgical History:   Procedure Laterality Date    CHOLECYSTECTOMY            Medications:   Current Outpatient Medications   Medication Instructions    acetaminophen (TYLENOL) 500 mg, Every 8 hours PRN    albuterol 90 mcg/actuation inhaler     buPROPion XL (WELLBUTRIN XL) 150 mg, Daily    gabapentin (NEURONTIN) 100 mg, 3 times daily    hydrOXYzine HCL (ATARAX) 25 mg, Every 4 hours PRN    ibuprofen 600 mg, Every 6 hours PRN    mirtazapine (REMERON) 15 mg, Nightly    multivitamin/iron/folic acid (CENTRUM COMPLETE ORAL) 1 tablet, Daily    pantoprazole (PROTONIX) 40 mg, oral, 2 times daily    polyethylene glycol (GoLYTELY) 236-22.74-6.74 -5.86 gram solution Drink 1/2 starting at 6 pm the  "night before your procedure then drink the 2nd 1/2 5 hours before procedure arrival time    sertraline (ZOLOFT) 100 mg, 2 times daily    simvastatin (ZOCOR) 20 mg, oral, Nightly    Stiolto Respimat 2.5-2.5 mcg/actuation mist inhaler 2 Inhalations, Daily    Wellbutrin  mg, Every 24 hours        Allergies:  No Known Allergies     Physical Exam:  Last Recorded Vitals  Height 1.575 m (5' 2\"), weight 92.5 kg (204 lb). , Body mass index is 37.31 kg/m².  []General appearance: Well-developed, well-nourished in no acute distress, conversant with normal voice quality    Head/face: No erythema or edema or facial tenderness, and normal facial nerve function bilaterally    External ear: Clear external auditory canals with normal pinnae  Tube status: N/A  Middle ear: Tympanic membranes intact and mobile, middle ears normal.  Tympanic membrane perforation: N/A  Mastoid bowl: N/A  Hearing: Normal conversational awareness at normal speech thresholds    Nose visualized using: Anterior rhinoscopy  Nasal dorsum: Nontraumatic midline appearance  Septum: Midline, nonobstructing  Inferior turbinates: Normal, pink  Secretions: Dry    Oral cavity and oropharynx: Normal  Teeth: Good condition  Floor of mouth: without lesions  Palate: Normal hard palate, soft palate and uvula  Oropharynx: Clear, no lesions present  Buccal mucosa: Normal without masses or lesions  Lips: Normal    Nasopharynx: Inadequate mirror exam secondary to gag/anatomy    Neck:  Salivary glands: Normal bilateral parotid and submandibular glands by inspection and palpation.  Non-thyroid masses: No palpable masses or significant lymphadenopathy  Trachea: Midline  Thyroid: No thyromegaly or palpable nodules  Temporomandibular joint: Nontender  Cervical range of motion: Normal    Neurologic exam: Alert and oriented x3, appropriate affect.  Cranial nerves II-XII normal bilaterally  Extraocular movement: Extraocular movement intact, normal gaze alignment        Johanny was " seen today for cerumen impaction.  Diagnoses and all orders for this visit:  Asymmetric sensorineural deafness (Primary)  Sensorineural hearing loss (SNHL) of both ears  Tinnitus of both ears       PLAN  We had a long discussion regarding her hearing loss.  She has an asymmetric loss that has progressed some in the past 8 years.  I would recommend proceeding with a hearing aid evaluation.  All of her questions were answered.  Recheck audiogram in 1 year    Dany Hollins MD

## 2025-02-27 ENCOUNTER — APPOINTMENT (OUTPATIENT)
Dept: ORTHOPEDIC SURGERY | Facility: CLINIC | Age: 70
End: 2025-02-27
Payer: MEDICARE

## 2025-02-27 DIAGNOSIS — M25.561 RIGHT KNEE PAIN, UNSPECIFIED CHRONICITY: ICD-10-CM

## 2025-02-27 DIAGNOSIS — S86.911A KNEE STRAIN, RIGHT, INITIAL ENCOUNTER: ICD-10-CM

## 2025-03-03 ENCOUNTER — APPOINTMENT (OUTPATIENT)
Dept: UROLOGY | Facility: CLINIC | Age: 70
End: 2025-03-03
Payer: MEDICARE

## 2025-03-03 NOTE — PROGRESS NOTES
AUDIOLOGY ADULT AUDIOMETRIC EVALUATION    Name:  Johanny Rosales  :  1955  Age:  69 y.o.  Date of Evaluation:  2025    Reason for visit: Ms. Rosales is seen in the clinic today at the request of Dany Hollins MD in otolaryngology for an audiologic evaluation.     HISTORY  The patient reported decreased hearing in the right ear, constant bilateral tinnitus, and a feeling of being off balance.      EVALUATION  See scanned audiogram: “Media” > “Audiology Report”.      RESULTS  Otoscopic Evaluation:  Right Ear: clear ear canal  Left Ear: clear ear canal    Immittance Measures:  Tympanometry:  Right Ear: Type A, normal tympanic membrane mobility with normal middle ear pressure   Left Ear: Type A, normal tympanic membrane mobility with normal middle ear pressure     Acoustic Reflexes:  Ipsilateral Right Ear: Could not evaluate since an adequate seal could not be maintained    Ipsilateral Left Ear: Could not evaluate since an adequate seal could not be maintained    Contralateral Right Ear: did not evaluate  Contralateral Left Ear: did not evaluate    Distortion Product Otoacoustic Emissions (DPOAEs):  Right Ear: Absent at 9289-7556 Hz   Left Ear: present at 1500 Hz; absent at 1000 and 5010-8965 Hz    Audiometry:  Test Technique and Reliability:   Standard audiometry via supra-aural headphones. Reliability is good.    Pure tone air and bone conduction audiometry:  Right Ear: mild sloping to profound sensorineural hearing loss at 1609-5016 Hz  Left Ear: mild sloping to moderately-severe sensorineural hearing loss at 4380-3141 Hz    Speech Audiometry (Word Recognition Scores):   Right Ear: Excellent, 96% in quiet at an elevated presentation level   Left Ear: Excellent, 96% in quiet at an elevated presentation level     IMPRESSIONS  Results of today's audiometric evaluation revealed an asymmetrical sensorineural hearing loss (right ear worse than left ear).  Results showed a progression in hearing loss  compared with the previous hearing evaluation from January 2017.  Results of tympanometry testing indicated normal middle ear function in both ears.   Present DPOAEs suggest normal/near normal cochlear outer hair cell function and are consistent with no greater than a mild hearing loss at those frequencies. Absent DPOAEs are consistent with abnormal cochlear outer hair cell function at those frequencies.    RECOMMENDATIONS  - Follow up with otolaryngology today as scheduled.  - Audiologic evaluation in conjunction with otologic care, if an acute change is noted, and/or annually.  - Hearing aid evaluation.      PATIENT EDUCATION  Discussed results, impressions and recommendations with the patient. Questions were addressed and the patient was encouraged to contact our office should concerns arise.    Time for this encounter: 2:15-2:45    Prerna Draper M.A., CCC-A   Licensed Audiologist

## 2025-03-04 ENCOUNTER — OFFICE VISIT (OUTPATIENT)
Dept: PRIMARY CARE | Facility: CLINIC | Age: 70
End: 2025-03-04
Payer: MEDICARE

## 2025-03-04 ENCOUNTER — CLINICAL SUPPORT (OUTPATIENT)
Dept: PHYSICAL THERAPY | Facility: CLINIC | Age: 70
End: 2025-03-04
Payer: MEDICARE

## 2025-03-04 ENCOUNTER — TELEPHONE (OUTPATIENT)
Dept: PRIMARY CARE | Facility: CLINIC | Age: 70
End: 2025-03-04

## 2025-03-04 VITALS
BODY MASS INDEX: 37.91 KG/M2 | WEIGHT: 206 LBS | OXYGEN SATURATION: 94 % | DIASTOLIC BLOOD PRESSURE: 76 MMHG | HEART RATE: 67 BPM | SYSTOLIC BLOOD PRESSURE: 138 MMHG | TEMPERATURE: 96.7 F | HEIGHT: 62 IN

## 2025-03-04 DIAGNOSIS — R42 DIZZINESS: Primary | ICD-10-CM

## 2025-03-04 DIAGNOSIS — R26.89 IMPAIRMENT OF BALANCE: ICD-10-CM

## 2025-03-04 DIAGNOSIS — R06.00 DYSPNEA, UNSPECIFIED TYPE: Primary | ICD-10-CM

## 2025-03-04 PROCEDURE — 99214 OFFICE O/P EST MOD 30 MIN: CPT | Performed by: FAMILY MEDICINE

## 2025-03-04 PROCEDURE — 1160F RVW MEDS BY RX/DR IN RCRD: CPT | Performed by: FAMILY MEDICINE

## 2025-03-04 PROCEDURE — 3008F BODY MASS INDEX DOCD: CPT | Performed by: FAMILY MEDICINE

## 2025-03-04 PROCEDURE — 97162 PT EVAL MOD COMPLEX 30 MIN: CPT | Mod: GP | Performed by: PHYSICAL THERAPIST

## 2025-03-04 PROCEDURE — 97530 THERAPEUTIC ACTIVITIES: CPT | Mod: GP | Performed by: PHYSICAL THERAPIST

## 2025-03-04 PROCEDURE — 1157F ADVNC CARE PLAN IN RCRD: CPT | Performed by: FAMILY MEDICINE

## 2025-03-04 PROCEDURE — 1159F MED LIST DOCD IN RCRD: CPT | Performed by: FAMILY MEDICINE

## 2025-03-04 PROCEDURE — 1126F AMNT PAIN NOTED NONE PRSNT: CPT | Performed by: FAMILY MEDICINE

## 2025-03-04 PROCEDURE — 1158F ADVNC CARE PLAN TLK DOCD: CPT | Performed by: FAMILY MEDICINE

## 2025-03-04 PROCEDURE — 1123F ACP DISCUSS/DSCN MKR DOCD: CPT | Performed by: FAMILY MEDICINE

## 2025-03-04 RX ORDER — DIAPER,BRIEF,ADULT, DISPOSABLE
EACH MISCELLANEOUS
COMMUNITY

## 2025-03-04 SDOH — ECONOMIC STABILITY: GENERAL: QUALITY OF LIFE: FAIR

## 2025-03-04 ASSESSMENT — PATIENT HEALTH QUESTIONNAIRE - PHQ9
1. LITTLE INTEREST OR PLEASURE IN DOING THINGS: NOT AT ALL
SUM OF ALL RESPONSES TO PHQ9 QUESTIONS 1 AND 2: 0
2. FEELING DOWN, DEPRESSED OR HOPELESS: NOT AT ALL

## 2025-03-04 ASSESSMENT — ENCOUNTER SYMPTOMS
LOSS OF SENSATION IN FEET: 1
OCCASIONAL FEELINGS OF UNSTEADINESS: 1
DEPRESSION: 1
LOSS OF SENSATION IN FEET: 1
OCCASIONAL FEELINGS OF UNSTEADINESS: 1
NO PATIENT REPORTED PAIN: 1
DEPRESSION: 1
SHORTNESS OF BREATH: 1

## 2025-03-04 ASSESSMENT — LIFESTYLE VARIABLES
SKIP TO QUESTIONS 9-10: 1
HOW OFTEN DO YOU HAVE A DRINK CONTAINING ALCOHOL: MONTHLY OR LESS
AUDIT-C TOTAL SCORE: 1
HOW OFTEN DO YOU HAVE SIX OR MORE DRINKS ON ONE OCCASION: NEVER
HOW MANY STANDARD DRINKS CONTAINING ALCOHOL DO YOU HAVE ON A TYPICAL DAY: 1 OR 2

## 2025-03-04 ASSESSMENT — ACTIVITIES OF DAILY LIVING (ADL): POOR_BALANCE: 1

## 2025-03-04 ASSESSMENT — PAIN SCALES - GENERAL: PAINLEVEL_OUTOF10: 0-NO PAIN

## 2025-03-04 NOTE — PROGRESS NOTES
History Of Present Illness  Johanny Rosales is a 69 y.o. female presenting for Shortness of Breath (Concerns with SOB for about an month. )  .    Shortness of Breath         For the past month, feeling increasingly short of breath with exertion. At night, feels like she can't get air in.   Has dry cough, sinus congestion a few months now. Takes loratidine over the counter daily.   No changes in medications.   Diet unchanged, appetite low.   No fever, chills, chest pain. No orthopnea, leg edema.  Takes ibupofen infrequently; no melena or hematochezia.  Not feeling well overall, also struggling with dizziness and has PT for vestibular therapy.    Past Medical History  Patient Active Problem List    Diagnosis Date Noted    Chronic diastolic heart failure 03/13/2025    Centrilobular emphysema (Multi) 03/13/2025    Dizziness 03/04/2025    Chronic pain syndrome 12/04/2023    Diplopia 12/04/2023    Exotropia of right eye 12/04/2023    Frequency-urgency syndrome 12/04/2023    Hyperhidrosis 12/04/2023    Impairment of balance 12/04/2023    Incontinence of urine in female 12/04/2023    Obesity (BMI 35.0-39.9 without comorbidity) 12/04/2023    Recurrent falls 12/04/2023    Tinnitus 12/04/2023    Urgency incontinence 12/04/2023    Vitamin D deficiency 12/04/2023    Depression 12/04/2023    Inflammatory arthritis 12/04/2023    Alcohol use 12/04/2023    Abnormal gait 10/19/2023    Age-related nuclear cataract of left eye 10/19/2023    Anxiety 10/19/2023    Artificial lens present 10/19/2023    Bipolar 1 disorder (Multi) 10/19/2023    Cataract 10/19/2023    Anxiety and depression 04/19/2023    Arthritis 04/19/2023    GERD (gastroesophageal reflux disease) 04/19/2023    Mixed hyperlipidemia 04/19/2023    Moderate episode of recurrent major depressive disorder 01/27/2023    Generalized anxiety disorder 01/27/2023    Cervicalgia 08/23/2011    Seborrheic keratosis 02/18/2011    Benign neoplasm of colon 06/29/2009    Tobacco use  disorder 06/29/2009        Medications  Current Outpatient Medications   Medication Sig Dispense Refill    acetaminophen (Tylenol) 500 mg tablet Take 1 tablet (500 mg) by mouth every 8 hours if needed for mild pain (1 - 3).      buPROPion XL (Wellbutrin XL) 150 mg 24 hr tablet Take 1 tablet (150 mg) by mouth once daily. Do not crush, chew, or split.      gabapentin (Neurontin) 100 mg capsule Take 1 capsule (100 mg) by mouth 3 times a day.      hydrOXYzine HCL (Atarax) 25 mg tablet Take 1 tablet (25 mg) by mouth every 4 hours if needed.      ibuprofen 600 mg tablet Take 1 tablet (600 mg) by mouth every 6 hours if needed for mild pain (1 - 3).      mirtazapine (Remeron) 15 mg tablet Take 1 tablet (15 mg) by mouth once daily at bedtime.      multivitamin/iron/folic acid (CENTRUM COMPLETE ORAL) Take 1 tablet by mouth once daily. Indications: treatment to prevent vitamin deficiency      pantoprazole (Protonix) 40 mg EC tablet Take 1 tablet (40 mg) by mouth 2 times a day. 180 tablet 3    polyethylene glycol (GoLYTELY) 236-22.74-6.74 -5.86 gram solution Drink 1/2 starting at 6 pm the night before your procedure then drink the 2nd 1/2 5 hours before procedure arrival time 4000 mL 0    sertraline (Zoloft) 100 mg tablet Take 1 tablet (100 mg) by mouth twice a day.      Stiolto Respimat 2.5-2.5 mcg/actuation mist inhaler Inhale 2 Inhalations once daily.      Wellbutrin  mg 24 hr tablet Take 1 tablet (300 mg) by mouth once every 24 hours.      albuterol 90 mcg/actuation inhaler Inhale 2 puffs every 4 hours if needed for wheezing. 18 g 1    diaper,brief,adult,disposable (Depend Underwear For Women XL) misc Use as directed 4 times a day      simvastatin (Zocor) 20 mg tablet Take 1 tablet (20 mg) by mouth once daily at bedtime. 90 tablet 3     No current facility-administered medications for this visit.        Surgical History  She has a past surgical history that includes Cholecystectomy.     Social History  She reports  "that she has never smoked. She has never been exposed to tobacco smoke. She has never used smokeless tobacco. She reports current alcohol use. She reports that she does not use drugs.    Lives in senior independent living.    Family History  Family History   Problem Relation Name Age of Onset    Breast cancer Sister  70        Allergies  Patient has no known allergies.    Immunizations  Immunization History   Administered Date(s) Administered    Flu vaccine (IIV4), preservative free *Check age/dose* 09/27/2018    Flu vaccine, quadrivalent, high-dose, preservative free, age 65y+ (FLUZONE) 10/12/2020, 10/04/2021    Flu vaccine, trivalent, preservative free, HIGH-DOSE, age 65y+ (Fluzone) 09/10/2024    Influenza Whole 11/03/2008    Influenza, seasonal, injectable 10/11/2011, 08/27/2012, 11/11/2013, 10/08/2014    Moderna COVID-19 vaccine, bivalent, blue cap/gray label *Check age/dose* 11/04/2022    Moderna SARS-CoV-2 Vaccination 03/04/2021, 04/01/2021, 11/08/2021    Pneumococcal conjugate vaccine, 13-valent (PREVNAR 13) 08/16/2022    Pneumococcal polysaccharide vaccine, 23-valent, age 2 years and older (PNEUMOVAX 23) 09/19/2018    Tdap vaccine, age 7 year and older (BOOSTRIX, ADACEL) 06/29/2009, 04/25/2014    Zoster, live 08/07/2017        ROS  Negative, except as discussed in HPI     Vitals  /76   Pulse 67   Temp 35.9 °C (96.7 °F)   Ht 1.575 m (5' 2\")   Wt 93.4 kg (206 lb)   LMP  (LMP Unknown)   SpO2 94%   BMI 37.68 kg/m²      Physical Exam  Vitals and nursing note reviewed.   Constitutional:       General: She is not in acute distress.     Appearance: Normal appearance.   Cardiovascular:      Rate and Rhythm: Normal rate and regular rhythm.      Heart sounds: Normal heart sounds.   Pulmonary:      Effort: No respiratory distress.      Breath sounds: Normal breath sounds.   Musculoskeletal:      Right lower leg: No edema.      Left lower leg: No edema.   Neurological:      General: No focal deficit present. "      Mental Status: She is alert. Mental status is at baseline.         Relevant Results  Lab Results   Component Value Date    WBC 4.8 03/05/2025    WBC 5.7 07/13/2023    HGB 13.1 03/05/2025    HGB 13.0 07/13/2023    HCT 38.3 03/05/2025    HCT 40.0 07/13/2023    MCV 95.0 03/05/2025    MCV 94 07/13/2023     03/05/2025     07/13/2023     Lab Results   Component Value Date     03/05/2025     (L) 10/01/2024    K 4.7 03/05/2025    K 4.4 10/01/2024    CL 98 03/05/2025    CL 98 10/01/2024    CO2 25 03/05/2025    CO2 27 10/01/2024    BUN 7 03/05/2025    BUN 6 10/01/2024    CREATININE 0.50 (L) 03/05/2025    CREATININE 0.55 10/01/2024    CALCIUM 9.6 03/05/2025    CALCIUM 9.2 10/01/2024    PROT 7.0 03/05/2025    PROT 6.8 10/01/2024    BILITOT 0.6 03/05/2025    BILITOT 0.6 10/01/2024    ALKPHOS 130 03/05/2025    ALKPHOS 118 10/01/2024    ALT 71 (H) 03/05/2025    ALT 70 (H) 10/01/2024     (H) 03/05/2025    AST 85 (H) 10/01/2024    GLUCOSE 79 03/05/2025    GLUCOSE 114 (H) 10/01/2024     Lab Results   Component Value Date    HGBA1C 4.3 10/01/2024     Lab Results   Component Value Date    TSH 3.69 03/05/2025    FREET4 0.9 10/16/2018      Lab Results   Component Value Date    CHOL 257 (H) 10/01/2024    TRIG 76 10/01/2024    .1 10/01/2024           Assessment/Plan   Johanny was seen today for shortness of breath.  Diagnoses and all orders for this visit:  Dyspnea, unspecified type (Primary)  -     Comprehensive Metabolic Panel; Future  -     TSH with reflex to Free T4 if abnormal; Future  -     CBC; Future  -     XR chest 2 views; Future  -     Comprehensive Metabolic Panel  -     TSH with reflex to Free T4 if abnormal  -     CBC  -     Referral to Cardiology; Future         Counseling:   Medication education:   -Education:  The patient is counseled regarding potential side-effects of any and all new medications  -Understanding:  Patient expressed understanding of information discussed  today  -Adherence:  No barriers to adherence identified    Final treatment plan is a result of shared decision making with patient.         Ravindra Miramontes MD

## 2025-03-04 NOTE — PROGRESS NOTES
Physical Therapy Evaluation and Treatment     Patient Name: Johanny Rosales  MRN: [id number]  Encounter date: 3/4/2025  Time Calculation  Start Time: 1247  Stop Time: 1330  Time Calculation (min): 43 min  PT Evaluation Time Entry  PT Evaluation (Moderate) Time Entry: 30  PT Therapeutic Procedures Time Entry  Therapeutic Activity Time Entry: 13  Moderate complexity due to patient's clinical presentation being evolving with changing characteristics, with comorbidities/complexities to include depression, balance, falls, dizziness , all of which may negatively impact rehab tolerance and progression.     Visit # 1 of 10  Visits/Dates Authorized: 1) EVAL ONLY - [facility] - AUTH THRU [facility] / $9350 OOP not met / 80% COVERAGE / [place] VISITS /    2) [employer] [facility] Opt Out - 100% coverage   Insurance Type: Payor: [employer] / Plan: [employer] DUAL ADVANTAGE / Product Type: *No Product type* /     Current Problem:   Problem List Items Addressed This Visit             ICD-10-CM    Impairment of balance R26.89    Relevant Orders    Follow Up In Physical Therapy    Dizziness - Primary R42    Relevant Orders    Follow Up In Physical Therapy     Precautions:  Precautions  STEADI Fall Risk Score (The score of 4 or more indicates an increased risk of falling): 12  Precautions Comment: Balance/Vestibular       Subjective    Subjective Evaluation    History of Present Illness  Mechanism of injury: Medical Dx: Imbalance 11/24/2    ASHLEE: Pt requested PT referral for balance with dizziness component in 11/24, multiple falls have been present; history of dizziness in 2017     PMHx: Depression/anxiety     Patient Subjective Report: Pt reports difficulty with balance especially with turns. She feels that she gets disoriented during turns and has had falls. She feels that she is unable to control her body when she turns. She also feels off when she is setting but unsure why. She does get SOB with activity. She does have an history of vertigo,  she had treatment with [facility] but doesn't know if it helped. She does report falling into the walls or bumping int objects. Due to this she seats most of her day as she is afraid to fall. She had a fall about 2 weeks ago after she turned around. She does report her apartment has a lot of furniture and it hard to maneuver. She does report B foot numbness at night but does not have issue with walking.         Quality of life: fair    Pain  No pain reported    Social Support  Lives in: apartment (elevator)  Lives with: alone    Diagnostic Tests  No diagnostic tests performed    Treatments  Previous treatment: physical therapy  Patient Goals  Patient goals for therapy: improved balance and increased strength           Objective      Objective     Functional Assessment     Comments  Balance:   Firm surface:   Romberg EO large sway Pedro Luis; not tested EC this date due to inability to maintain EO   Tandem unable   SLS unable          Neuro Oculomotor:  Range of Motion: Normal  Smooth Pursuit: Normal  Horizontal Saccades: Normal  Vertical Saccades: Normal  Eye Alignment : Other: (comment) (R eye abducts)  Distraction Uncover: Abnormal, Other: (comment) (During L eye uncover eye will deviate form abduction to center; during R eye uncovery will deviates from abduction to center)  Convergence: Normal  Neuro Vestibular:  Horizontal VOR: Other: (comment) (noted nystagmus at end of trial unclear as to direction due to blinking but did look more R sided)  Vertical VOR: Negative  R head thrust: Other: (comment) (Nystagmus noted unclear for formal testing due to cervical muscle guarding but in general noted nystagmus)  L head thrust: Other: (comment) (Nystagmus noted unclear for formal testing due to cervical muscle guarding but in general noted nystagmus)  Spontaneous Nystagmus: Absent  Gaze Evoked Nystagmus: Absent    Outcome Measures:  Other Measures  Activities - Specific Balance Confidence Scale: 24%  Dynamic Gait Index (DGI): 9/12  (path deviation during head nods and slight during head turns; imbalance with stagger step with turns)   Dynamic Gait Index (DGI), a score of 19 or less indicates increased fall risk    Gait on level surface 3  Change in gait speed 2  Gait with horizontal head turns 2  Gait with vertical head turns 1  Gait and pivot turn 2    Treatments:  Therapeutic Activity  Therapeutic Activity Performed: Yes  Therapeutic Activity 1: Educated on balance systems in relation to findings  Therapeutic Activity 2: Review of HEP    HEP / Access Codes:   Access Code: [email or web address]  URL: [email or web address]  Date: 03/04/2025  Prepared by: Cassandra De    Exercises  - Seated Horizontal Smooth Pursuit  - 2 x daily - 7 x weekly - 3 sets - 30 seconds  - Seated Vertical Smooth Pursuit  - 1 x daily - 7 x weekly - 3 sets - 30 seconds  - Seated Horizontal Saccades  - 1 x daily - 7 x weekly - 3 sets - 30 seconds  - Seated Vertical Saccades  - 1 x daily - 7 x weekly - 3 sets - 30 seconds  - Seated Gaze Stabilization with Head Rotation  - 1 x daily - 7 x weekly - 3 sets - 30 seconds  - Seated Gaze Stabilization with Head Nod  - 1 x daily - 7 x weekly - 3 sets - 30 seconds    Assessment   Assessment & Plan     Assessment  Impairments: abnormal gait, activity intolerance, impaired balance, impaired physical strength and lacks appropriate home exercise program  Assessment details: Pt is a 69 y.o female presenting to therapy with balance/dizziness concerns. Pt demonstrated reduction in vestibular and general balance function this date both in seated position and higher dynamic tasks. Ptosis noted during cover/uncover test which does appear to be a chronic problem per pt report. Overall symptoms appear to be multifactor with overall hypofunction of balance systems. At this time pt would benefit from skilled physical therapy in order to prevent further functional decline and reduce fall risk.    Prognosis: fair    Plan  Therapy options: will be  seen for skilled physical therapy services  Planned therapy interventions: balance/weight-bearing training, body mechanics training, postural training, strengthening, stretching, functional ROM exercises, gait training, home exercise program, therapeutic activities, transfer training, abdominal trunk stabilization, manual therapy, neuromuscular re-education, flexibility, soft tissue mobilization and fine motor coordination training  Frequency: 1x week  Duration in visits: 10  Duration in weeks: 11  Treatment plan discussed with: patient           Goals:   Active       PT Problem       PT Goal 1       Start:  03/04/25    Expected End:  04/23/25       Pt will be 50% IND with HEP in 5 weeks in order to progress with therapy.  Pt will improve DGI score to 11/12 in 5 weeks in order to reduce fall risk.   Pt will be able to perform romberg stance with EC without assist in 5 weeks required for dressing/bathing.            PT Goal 2       Start:  03/04/25    Expected End:  06/12/25       Pt will be 100% IND with HEP in 10 weeks in order to maintain progress with therapy.    Pt will improve DGI score to 20/24 in 10 weeks in order to reduce fall risk.  Pt will be able to perform tandem stance without LOB in 10 weeks required for reduction in fall risk and ability to change YOLIS for ambulation.  Pt will be able to perform sit to stand without UE assist or LOB in 10 weeks required for daily transfers.

## 2025-03-05 ENCOUNTER — HOSPITAL ENCOUNTER (OUTPATIENT)
Dept: RADIOLOGY | Facility: HOSPITAL | Age: 70
Discharge: HOME | End: 2025-03-05
Payer: MEDICARE

## 2025-03-05 DIAGNOSIS — R06.00 DYSPNEA, UNSPECIFIED TYPE: ICD-10-CM

## 2025-03-05 PROCEDURE — 71046 X-RAY EXAM CHEST 2 VIEWS: CPT

## 2025-03-06 LAB
ALBUMIN SERPL-MCNC: 4.6 G/DL (ref 3.6–5.1)
ALP SERPL-CCNC: 130 U/L (ref 37–153)
ALT SERPL-CCNC: 71 U/L (ref 6–29)
ANION GAP SERPL CALCULATED.4IONS-SCNC: 12 MMOL/L (CALC) (ref 7–17)
AST SERPL-CCNC: 109 U/L (ref 10–35)
BILIRUB SERPL-MCNC: 0.6 MG/DL (ref 0.2–1.2)
BUN SERPL-MCNC: 7 MG/DL (ref 7–25)
CALCIUM SERPL-MCNC: 9.6 MG/DL (ref 8.6–10.4)
CHLORIDE SERPL-SCNC: 98 MMOL/L (ref 98–110)
CO2 SERPL-SCNC: 25 MMOL/L (ref 20–32)
CREAT SERPL-MCNC: 0.5 MG/DL (ref 0.6–1)
EGFRCR SERPLBLD CKD-EPI 2021: 100 ML/MIN/1.73M2
ERYTHROCYTE [DISTWIDTH] IN BLOOD BY AUTOMATED COUNT: 11.8 % (ref 11–15)
GLUCOSE SERPL-MCNC: 79 MG/DL (ref 65–99)
HCT VFR BLD AUTO: 38.3 % (ref 35–45)
HGB BLD-MCNC: 13.1 G/DL (ref 11.7–15.5)
MCH RBC QN AUTO: 32.5 PG (ref 27–33)
MCHC RBC AUTO-ENTMCNC: 34.2 G/DL (ref 32–36)
MCV RBC AUTO: 95 FL (ref 80–100)
PLATELET # BLD AUTO: 209 THOUSAND/UL (ref 140–400)
PMV BLD REES-ECKER: 9.7 FL (ref 7.5–12.5)
POTASSIUM SERPL-SCNC: 4.7 MMOL/L (ref 3.5–5.3)
PROT SERPL-MCNC: 7 G/DL (ref 6.1–8.1)
RBC # BLD AUTO: 4.03 MILLION/UL (ref 3.8–5.1)
SODIUM SERPL-SCNC: 135 MMOL/L (ref 135–146)
TSH SERPL-ACNC: 3.69 MIU/L (ref 0.4–4.5)
WBC # BLD AUTO: 4.8 THOUSAND/UL (ref 3.8–10.8)

## 2025-03-11 ENCOUNTER — APPOINTMENT (OUTPATIENT)
Dept: PHYSICAL THERAPY | Facility: CLINIC | Age: 70
End: 2025-03-11
Payer: MEDICARE

## 2025-03-11 ENCOUNTER — DOCUMENTATION (OUTPATIENT)
Dept: PHYSICAL THERAPY | Facility: CLINIC | Age: 70
End: 2025-03-11
Payer: MEDICARE

## 2025-03-11 DIAGNOSIS — R42 DIZZINESS: Primary | ICD-10-CM

## 2025-03-11 DIAGNOSIS — R26.89 IMPAIRMENT OF BALANCE: ICD-10-CM

## 2025-03-11 NOTE — PROGRESS NOTES
Physical Therapy                 Therapy Communication Note    Patient Name: Johanny Rosales  MRN: [id number]  Department:   Room: Room/bed info not found  Today's Date: 3/11/2025     Discipline: Physical Therapy          Missed Visit Reason:      Missed Time: Cancel    Comment:

## 2025-03-13 ENCOUNTER — OFFICE VISIT (OUTPATIENT)
Dept: PRIMARY CARE | Facility: CLINIC | Age: 70
End: 2025-03-13
Payer: MEDICARE

## 2025-03-13 VITALS
WEIGHT: 200 LBS | HEART RATE: 80 BPM | HEIGHT: 62 IN | TEMPERATURE: 95.9 F | BODY MASS INDEX: 36.8 KG/M2 | DIASTOLIC BLOOD PRESSURE: 80 MMHG | OXYGEN SATURATION: 93 % | SYSTOLIC BLOOD PRESSURE: 118 MMHG

## 2025-03-13 DIAGNOSIS — F31.9 BIPOLAR 1 DISORDER (MULTI): ICD-10-CM

## 2025-03-13 DIAGNOSIS — Z12.31 ENCOUNTER FOR SCREENING MAMMOGRAM FOR MALIGNANT NEOPLASM OF BREAST: ICD-10-CM

## 2025-03-13 DIAGNOSIS — J43.2 CENTRILOBULAR EMPHYSEMA (MULTI): ICD-10-CM

## 2025-03-13 DIAGNOSIS — Z00.00 ANNUAL PHYSICAL EXAM: Primary | ICD-10-CM

## 2025-03-13 DIAGNOSIS — E78.49 OTHER HYPERLIPIDEMIA: ICD-10-CM

## 2025-03-13 DIAGNOSIS — I50.32 CHRONIC DIASTOLIC HEART FAILURE: ICD-10-CM

## 2025-03-13 DIAGNOSIS — Z12.11 SCREENING FOR COLON CANCER: ICD-10-CM

## 2025-03-13 DIAGNOSIS — R06.00 DYSPNEA, UNSPECIFIED TYPE: ICD-10-CM

## 2025-03-13 PROCEDURE — 1126F AMNT PAIN NOTED NONE PRSNT: CPT | Performed by: FAMILY MEDICINE

## 2025-03-13 PROCEDURE — 1123F ACP DISCUSS/DSCN MKR DOCD: CPT | Performed by: FAMILY MEDICINE

## 2025-03-13 PROCEDURE — G0439 PPPS, SUBSEQ VISIT: HCPCS | Performed by: FAMILY MEDICINE

## 2025-03-13 PROCEDURE — 99215 OFFICE O/P EST HI 40 MIN: CPT | Performed by: FAMILY MEDICINE

## 2025-03-13 PROCEDURE — 1159F MED LIST DOCD IN RCRD: CPT | Performed by: FAMILY MEDICINE

## 2025-03-13 PROCEDURE — 3008F BODY MASS INDEX DOCD: CPT | Performed by: FAMILY MEDICINE

## 2025-03-13 PROCEDURE — 99497 ADVNCD CARE PLAN 30 MIN: CPT | Performed by: FAMILY MEDICINE

## 2025-03-13 PROCEDURE — 99497 ADVNCD CARE PLAN 30 MIN: CPT | Mod: 25 | Performed by: FAMILY MEDICINE

## 2025-03-13 PROCEDURE — 1158F ADVNC CARE PLAN TLK DOCD: CPT | Performed by: FAMILY MEDICINE

## 2025-03-13 PROCEDURE — 1160F RVW MEDS BY RX/DR IN RCRD: CPT | Performed by: FAMILY MEDICINE

## 2025-03-13 PROCEDURE — 1157F ADVNC CARE PLAN IN RCRD: CPT | Performed by: FAMILY MEDICINE

## 2025-03-13 RX ORDER — ALBUTEROL SULFATE 90 UG/1
2 INHALANT RESPIRATORY (INHALATION) EVERY 4 HOURS PRN
Qty: 18 G | Refills: 1 | Status: SHIPPED | OUTPATIENT
Start: 2025-03-13

## 2025-03-13 RX ORDER — SIMVASTATIN 20 MG/1
20 TABLET, FILM COATED ORAL NIGHTLY
Qty: 90 TABLET | Refills: 3 | Status: SHIPPED | OUTPATIENT
Start: 2025-03-13 | End: 2026-03-13

## 2025-03-13 ASSESSMENT — ENCOUNTER SYMPTOMS
LOSS OF SENSATION IN FEET: 1
OCCASIONAL FEELINGS OF UNSTEADINESS: 1
DEPRESSION: 1

## 2025-03-13 ASSESSMENT — LIFESTYLE VARIABLES
SKIP TO QUESTIONS 9-10: 1
HOW OFTEN DO YOU HAVE A DRINK CONTAINING ALCOHOL: MONTHLY OR LESS
HOW OFTEN DO YOU HAVE SIX OR MORE DRINKS ON ONE OCCASION: NEVER
HOW MANY STANDARD DRINKS CONTAINING ALCOHOL DO YOU HAVE ON A TYPICAL DAY: 1 OR 2
AUDIT-C TOTAL SCORE: 1

## 2025-03-13 ASSESSMENT — PAIN SCALES - GENERAL: PAINLEVEL_OUTOF10: 0-NO PAIN

## 2025-03-13 NOTE — PROGRESS NOTES
History Of Present Illness  Johanny Rosales is a 69 y.o. female presenting for a wellness exam.    Preventative screenings  Last mammogram 3/18/2024.  Last DEXA 4/15/2021 was normal.  She is up-to-date with immunizations, had zoster in the past but defers Shingrix at this time.     Other medical issues/concerns  Sees psychiatry for mood which is stable.  Chronic GERD controlled with pantoprazole.  Has hyperlipidemia, on simvastatin, tolerating it without any myalgias.  She reports shortness of breath has improved since knowing chest x-ray was normal, does not use her albuterol inhaler as much.  Still has appointment with pulmonology in May.    Labs done 3/5/35    Patient Care Team:  Ravindra Miramontes MD as PCP - General (Family Medicine)  Ravindra Miramontes MD as PCP - [employer] PCP  Ravindra Miramontes MD as PCP - [facility] PCP  Carolina Olson MD as Referring Physician (Psychiatry)  Dany Hollins MD as Surgeon (Otolaryngology)  Khoa Yan MD as Referring Physician     Geriatric screening  Sister jeronimo, kids twin boys with 5 grandkids  Son Willem is HCPOA, has living will. Wants DNR CCA. DNR form completed with patient today and scanned into chart and original copy given to patient.    General health: Good  Exercise: Balanced  Caffeine: Limited   Diet: Balanced  Alcohol use: occasionally    Functional ability:   No cognitive impairment observed.    No cognitive impairment reported by patient or family.    ADL screening:  Hearing without difficulties.  Independent in bathing, dressing, walking in home    IADL screening:  Independent in managing finances, grocery shopping, taking medications, doing housework    Home Safety:   Falls Home safety risk factors: No loose rugs, poor lighting, stairs without rails, bathroom with no grab bars    Depression screening:  Patient Health Questionnaire-2 Score: 0     The ASCVD Risk score (Cinda HAYNES, et al., 2019) failed to calculate for the following reasons:    The  valid HDL cholesterol range is 20 to 100 mg/dL       Past Medical History  Patient Active Problem List    Diagnosis Date Noted    Chronic diastolic heart failure 03/13/2025    Centrilobular emphysema (Multi) 03/13/2025    Dizziness 03/04/2025    Chronic pain syndrome 12/04/2023    Diplopia 12/04/2023    Exotropia of right eye 12/04/2023    Frequency-urgency syndrome 12/04/2023    Hyperhidrosis 12/04/2023    Impairment of balance 12/04/2023    Incontinence of urine in female 12/04/2023    Obesity (BMI 35.0-39.9 without comorbidity) 12/04/2023    Recurrent falls 12/04/2023    Tinnitus 12/04/2023    Urgency incontinence 12/04/2023    Vitamin D deficiency 12/04/2023    Depression 12/04/2023    Inflammatory arthritis 12/04/2023    Alcohol use 12/04/2023    Abnormal gait 10/19/2023    Age-related nuclear cataract of left eye 10/19/2023    Anxiety 10/19/2023    Artificial lens present 10/19/2023    Bipolar 1 disorder (Multi) 10/19/2023    Cataract 10/19/2023    Anxiety and depression 04/19/2023    Arthritis 04/19/2023    GERD (gastroesophageal reflux disease) 04/19/2023    Mixed hyperlipidemia 04/19/2023    Moderate episode of recurrent major depressive disorder 01/27/2023    Generalized anxiety disorder 01/27/2023    Cervicalgia 08/23/2011    Seborrheic keratosis 02/18/2011    Benign neoplasm of colon 06/29/2009    Tobacco use disorder 06/29/2009        Medications  Medications and current supplements were reviewed and recorded.   Does the patient use opiate medications:  No .  Current Outpatient Medications   Medication Sig Dispense Refill    acetaminophen (Tylenol) 500 mg tablet Take 1 tablet (500 mg) by mouth every 8 hours if needed for mild pain (1 - 3).      buPROPion XL (Wellbutrin XL) 150 mg 24 hr tablet Take 1 tablet (150 mg) by mouth once daily. Do not crush, chew, or split.      diaper,brief,adult,disposable (Depend Underwear For Women XL) misc Use as directed 4 times a day      gabapentin (Neurontin) 100 mg  capsule Take 1 capsule (100 mg) by mouth 3 times a day.      hydrOXYzine HCL (Atarax) 25 mg tablet Take 1 tablet (25 mg) by mouth every 4 hours if needed.      ibuprofen 600 mg tablet Take 1 tablet (600 mg) by mouth every 6 hours if needed for mild pain (1 - 3).      mirtazapine (Remeron) 15 mg tablet Take 1 tablet (15 mg) by mouth once daily at bedtime.      multivitamin/iron/folic acid (CENTRUM COMPLETE ORAL) Take 1 tablet by mouth once daily. Indications: treatment to prevent vitamin deficiency      pantoprazole (Protonix) 40 mg EC tablet Take 1 tablet (40 mg) by mouth 2 times a day. 180 tablet 3    polyethylene glycol (GoLYTELY) 236-22.74-6.74 -5.86 gram solution Drink 1/2 starting at 6 pm the night before your procedure then drink the 2nd 1/2 5 hours before procedure arrival time 4000 mL 0    sertraline (Zoloft) 100 mg tablet Take 1 tablet (100 mg) by mouth twice a day.      Stiolto Respimat 2.5-2.5 mcg/actuation mist inhaler Inhale 2 Inhalations once daily.      Wellbutrin  mg 24 hr tablet Take 1 tablet (300 mg) by mouth once every 24 hours.      albuterol 90 mcg/actuation inhaler Inhale 2 puffs every 4 hours if needed for wheezing. 18 g 1    simvastatin (Zocor) 20 mg tablet Take 1 tablet (20 mg) by mouth once daily at bedtime. 90 tablet 3     No current facility-administered medications for this visit.        Surgical History  She has a past surgical history that includes Cholecystectomy.     Social History  She reports that she has never smoked. She has never been exposed to tobacco smoke. She has never used smokeless tobacco. She reports current alcohol use. She reports that she does not use drugs.    Family History  Family History   Problem Relation Name Age of Onset    Breast cancer Sister  70        Allergies  Patient has no known allergies.    Immunizations  Immunization History   Administered Date(s) Administered    Flu vaccine (IIV4), preservative free *Check age/dose* 09/27/2018    Flu vaccine,  "quadrivalent, high-dose, preservative free, age 65y+ (FLUZONE) 10/12/2020, 10/04/2021    Flu vaccine, trivalent, preservative free, HIGH-DOSE, age 65y+ (Fluzone) 09/10/2024    Influenza Whole 11/03/2008    Influenza, seasonal, injectable 10/11/2011, 08/27/2012, 11/11/2013, 10/08/2014    Moderna COVID-19 vaccine, bivalent, blue cap/gray label *Check age/dose* 11/04/2022    Moderna SARS-CoV-2 Vaccination 03/04/2021, 04/01/2021, 11/08/2021    Pneumococcal conjugate vaccine, 13-valent (PREVNAR 13) 08/16/2022    Pneumococcal polysaccharide vaccine, 23-valent, age 2 years and older (PNEUMOVAX 23) 09/19/2018    Tdap vaccine, age 7 year and older (BOOSTRIX, ADACEL) 06/29/2009, 04/25/2014    Zoster, live 08/07/2017        ROS  Negative, except as discussed in HPI     Vitals  /80   Pulse 80   Temp 35.5 °C (95.9 °F)   Ht 1.575 m (5' 2\")   Wt 90.7 kg (200 lb)   LMP  (LMP Unknown)   SpO2 93%   BMI 36.58 kg/m²      Physical Exam  Vitals and nursing note reviewed.   Constitutional:       Appearance: Normal appearance.   HENT:      Head: Normocephalic.      Right Ear: Tympanic membrane normal.      Left Ear: Tympanic membrane normal.      Nose: Nose normal.      Mouth/Throat:      Mouth: Mucous membranes are moist.   Eyes:      Extraocular Movements: Extraocular movements intact.      Conjunctiva/sclera: Conjunctivae normal.      Pupils: Pupils are equal, round, and reactive to light.   Cardiovascular:      Rate and Rhythm: Normal rate and regular rhythm.      Heart sounds: Normal heart sounds.   Pulmonary:      Effort: Pulmonary effort is normal. No respiratory distress.      Breath sounds: Normal breath sounds.   Abdominal:      General: Abdomen is flat.      Palpations: Abdomen is soft.      Tenderness: There is no abdominal tenderness.   Musculoskeletal:      Cervical back: Neck supple.   Lymphadenopathy:      Cervical: No cervical adenopathy.   Skin:     General: Skin is warm and dry.      Findings: No rash. "   Neurological:      General: No focal deficit present.      Mental Status: She is alert. Mental status is at baseline.      Coordination: Coordination normal.      Gait: Gait normal.      Deep Tendon Reflexes: Reflexes normal.   Psychiatric:         Mood and Affect: Mood normal.         Behavior: Behavior normal.         Relevant Results  Lab Results   Component Value Date    WBC 4.8 03/05/2025    WBC 5.7 07/13/2023    HGB 13.1 03/05/2025    HGB 13.0 07/13/2023    HCT 38.3 03/05/2025    HCT 40.0 07/13/2023    MCV 95.0 03/05/2025    MCV 94 07/13/2023     03/05/2025     07/13/2023     Lab Results   Component Value Date     03/05/2025     (L) 10/01/2024    K 4.7 03/05/2025    K 4.4 10/01/2024    CL 98 03/05/2025    CL 98 10/01/2024    CO2 25 03/05/2025    CO2 27 10/01/2024    BUN 7 03/05/2025    BUN 6 10/01/2024    CREATININE 0.50 (L) 03/05/2025    CREATININE 0.55 10/01/2024    CALCIUM 9.6 03/05/2025    CALCIUM 9.2 10/01/2024    PROT 7.0 03/05/2025    PROT 6.8 10/01/2024    BILITOT 0.6 03/05/2025    BILITOT 0.6 10/01/2024    ALKPHOS 130 03/05/2025    ALKPHOS 118 10/01/2024    ALT 71 (H) 03/05/2025    ALT 70 (H) 10/01/2024     (H) 03/05/2025    AST 85 (H) 10/01/2024    GLUCOSE 79 03/05/2025    GLUCOSE 114 (H) 10/01/2024     Lab Results   Component Value Date    HGBA1C 4.3 10/01/2024     Lab Results   Component Value Date    TSH 3.69 03/05/2025    FREET4 0.9 10/16/2018      Lab Results   Component Value Date    CHOL 257 (H) 10/01/2024    TRIG 76 10/01/2024    .1 10/01/2024           Assessment/Plan   Johanny was seen today for annual exam.  Diagnoses and all orders for this visit:  Annual physical exam (Primary)  Dyspnea, unspecified type  -     albuterol 90 mcg/actuation inhaler; Inhale 2 puffs every 4 hours if needed for wheezing.  -     Aerochamber Spacer Device  Other hyperlipidemia  -     simvastatin (Zocor) 20 mg tablet; Take 1 tablet (20 mg) by mouth once daily at  bedtime.  Encounter for screening mammogram for malignant neoplasm of breast  -     BI mammo bilateral screening tomosynthesis; Future  Screening for colon cancer  -     Colonoscopy Screening; Average Risk Patient; Future  Chronic diastolic heart failure  Comments:  euvolemic  Centrilobular emphysema (Multi)  Comments:  sees pulm  Bipolar 1 disorder (Multi)  Comments:  sees BH, stable       Medications Discontinued During This Encounter   Medication Reason    albuterol 90 mcg/actuation inhaler Reorder    simvastatin (Zocor) 20 mg tablet Reorder        Counseling:   Medication education:   -Education:  The patient is counseled regarding potential side-effects of any and all new medications  -Understanding:  Patient expressed understanding of information discussed today  -Adherence:  No barriers to adherence identified    Advanced care planning: 15 mins spent for ACP. HCPOA discussed and updated. Discussed patient's wishes for code status, explained to explain and then we signed DNR form, uploaded it, and gave original for for patient to take home for her reference and discuss with her son.    Final treatment plan is a result of shared decision making with patient.         Ravindra Miramontes MD

## 2025-03-17 ENCOUNTER — APPOINTMENT (OUTPATIENT)
Dept: PHYSICAL THERAPY | Facility: CLINIC | Age: 70
End: 2025-03-17
Payer: MEDICARE

## 2025-03-17 ENCOUNTER — DOCUMENTATION (OUTPATIENT)
Dept: PHYSICAL THERAPY | Facility: CLINIC | Age: 70
End: 2025-03-17
Payer: MEDICARE

## 2025-03-17 DIAGNOSIS — R26.89 IMPAIRMENT OF BALANCE: ICD-10-CM

## 2025-03-17 DIAGNOSIS — R42 DIZZINESS: Primary | ICD-10-CM

## 2025-03-17 NOTE — PROGRESS NOTES
Physical Therapy                 Therapy Communication Note    Patient Name: Johanny Rosales  MRN: [id number]  Department:   Room: Room/bed info not found  Today's Date: 3/17/2025     Discipline: Physical Therapy      Missed Time: Cancel    Comment: Sick

## 2025-03-21 ENCOUNTER — APPOINTMENT (OUTPATIENT)
Dept: PHYSICAL THERAPY | Facility: CLINIC | Age: 70
End: 2025-03-21
Payer: MEDICARE

## 2025-03-21 ENCOUNTER — APPOINTMENT (OUTPATIENT)
Dept: RADIOLOGY | Facility: HOSPITAL | Age: 70
End: 2025-03-21
Payer: MEDICARE

## 2025-03-21 ENCOUNTER — DOCUMENTATION (OUTPATIENT)
Dept: PHYSICAL THERAPY | Facility: CLINIC | Age: 70
End: 2025-03-21
Payer: MEDICARE

## 2025-03-21 DIAGNOSIS — R26.89 IMPAIRMENT OF BALANCE: ICD-10-CM

## 2025-03-21 DIAGNOSIS — R42 DIZZINESS: Primary | ICD-10-CM

## 2025-03-21 NOTE — PROGRESS NOTES
Physical Therapy                 Therapy Communication Note    Patient Name: Johanny Rosales  MRN: [id number]  Department:   Room: Room/bed info not found  Today's Date: 3/21/2025     Discipline: Physical Therapy      Missed Time: Cancel    Comment: Pt called into office, cx all appointments as right now she is unable to keep up with therapy due to other life situations. Will hold chart for 30 days prior to formal discharge.

## 2025-03-24 ENCOUNTER — APPOINTMENT (OUTPATIENT)
Dept: PHYSICAL THERAPY | Facility: CLINIC | Age: 70
End: 2025-03-24
Payer: MEDICARE

## 2025-03-24 DIAGNOSIS — R26.89 IMPAIRMENT OF BALANCE: ICD-10-CM

## 2025-03-24 DIAGNOSIS — R42 DIZZINESS: Primary | ICD-10-CM

## 2025-03-31 ENCOUNTER — APPOINTMENT (OUTPATIENT)
Dept: PHYSICAL THERAPY | Facility: CLINIC | Age: 70
End: 2025-03-31
Payer: MEDICARE

## 2025-03-31 DIAGNOSIS — R26.89 IMPAIRMENT OF BALANCE: ICD-10-CM

## 2025-03-31 DIAGNOSIS — R42 DIZZINESS: Primary | ICD-10-CM

## 2025-04-03 ENCOUNTER — TREATMENT (OUTPATIENT)
Dept: PHYSICAL THERAPY | Facility: CLINIC | Age: 70
End: 2025-04-03
Payer: MEDICARE

## 2025-04-03 DIAGNOSIS — R26.89 IMPAIRMENT OF BALANCE: ICD-10-CM

## 2025-04-03 DIAGNOSIS — R42 DIZZINESS: ICD-10-CM

## 2025-04-03 PROCEDURE — 97110 THERAPEUTIC EXERCISES: CPT | Mod: GP

## 2025-04-03 NOTE — PROGRESS NOTES
"Physical Therapy    Physical Therapy Evaluation and Treatment    Patient Name: Johanny Rosales  MRN: [id number]  Encounter Date: 4/3/2025     Time Calculation  Start Time: 1015  Stop Time: 1055  Time Calculation (min): 40 min    Current Problem:   1. Impairment of balance  Follow Up In Physical Therapy      2. Dizziness  Follow Up In Physical Therapy             SUBJECTIVE:   68 y/o F presents with balance deficits, frequent falls. Was being seen over at [facility]- due to the parking lot being so far away from the front entrance and there being a steep hill from the parking lot patient felt like it would exacerbate her COPD, so she would like to continue her PT treatments with us here at [facility].     Patient reports experiencing significant balance difficulties, particularly when turning. She describes a sensation of disorientation during turns, often feeling as though she loses control of her body, which has resulted in multiple falls. Additionally, she reports feeling \"off\" when transitioning to a seated position, though she is unsure of the cause. She experiences shortness of breath with physical activity.    The patient has a history of vertigo and previously underwent treatment for BPPV; however, she is uncertain whether it was effective. She notes frequent episodes of veering into walls or bumping into objects while ambulating. Due to her fear of falling, she spends the majority of her day seated.       Pain:   2/10    OBJECTIVE:    Objective:  Romberg 4-Stage Balance Test:  Feet together: fair  Semi-tandem stance: minimal difficulty  Tandem stance: unable to perform  Single-leg stance: unable to perform    Strength: 4/5 bilaterally in LE    Sit-to-Stand (STS) Test: 5x STS completed in 15 seconds    Balance on Airex Pad: Unable to maintain standing balance    Gait: Demonstrates significant difficulty with quick turning    Observation: Cautious gait pattern; increased reliance on visual input and upper extremity " support during mobility        Treatments:  Therapeutic Exercise: (40 minutes)   Assessment + establish goals    HEP:   Romberg 4 way stance   Squats  Sit to stands     OP Education: see above     HEP:  See above    Response to treatment: good    ASSESSMENT:   The patient presents with impaired balance and postural control, particularly during dynamic tasks such as turning. Findings are consistent with decreased lower extremity strength and proprioceptive deficits, compounded by a possible unresolved vestibular component. The patient’s history of vertigo, falls, fear of movement (kinesiophobia), and avoidance behavior have contributed to reduced functional mobility and increased fall risk. Environmental barriers at home further exacerbate her instability. She would benefit from a comprehensive balance and vestibular rehabilitation program, strength training, and functional mobility retraining, with a focus on safety and confidence-building.    Complexity of Evaluation: low  Based on the history including personal factors and/or comorbidities, examination of body systems including body structures and function, activity limitations, and/or participation restrictions, as well as clinical presentation, patient meets criteria for a low complexity evaluation.     PLAN:  OP PT PLAN:  Treatment/Interventions: Education/Instruction , Gait training , Neuromuscular re-education , Therapeutic activities , and Therapeutic exercise    PT Plan: Skilled PT   PT Frequency: 1 time per week   Duration:4 weeks  Insurance: 7 visits 3/17-5/15 CPTs [id number]   Visits Approved: 7  Certification Period Start Date: 03/17  Certification Period End Date: 05/15  Rehab Potential: Fair  Plan of Care Agreement: Patient         Goals:   Pt will be 50% IND with HEP in 5 weeks in order to progress with therapy.  Pt will improve DGI score to 11/12 in 5 weeks in order to reduce fall risk.   Pt will be able to perform romberg stance  with EC without assist in 5 weeks required for dressing/bathing.

## 2025-04-07 ENCOUNTER — APPOINTMENT (OUTPATIENT)
Dept: PHYSICAL THERAPY | Facility: CLINIC | Age: 70
End: 2025-04-07
Payer: MEDICARE

## 2025-04-07 DIAGNOSIS — R26.89 IMPAIRMENT OF BALANCE: ICD-10-CM

## 2025-04-07 DIAGNOSIS — R42 DIZZINESS: Primary | ICD-10-CM

## 2025-04-08 ENCOUNTER — APPOINTMENT (OUTPATIENT)
Dept: PHYSICAL THERAPY | Facility: CLINIC | Age: 70
End: 2025-04-08
Payer: MEDICARE

## 2025-04-14 ENCOUNTER — APPOINTMENT (OUTPATIENT)
Dept: PHYSICAL THERAPY | Facility: CLINIC | Age: 70
End: 2025-04-14
Payer: MEDICARE

## 2025-04-14 DIAGNOSIS — D12.6 BENIGN NEOPLASM OF COLON, UNSPECIFIED PART OF COLON: ICD-10-CM

## 2025-04-14 DIAGNOSIS — R26.89 IMPAIRMENT OF BALANCE: ICD-10-CM

## 2025-04-14 DIAGNOSIS — R42 DIZZINESS: Primary | ICD-10-CM

## 2025-04-14 RX ORDER — POLYETHYLENE GLYCOL 3350, SODIUM SULFATE ANHYDROUS, SODIUM BICARBONATE, SODIUM CHLORIDE, POTASSIUM CHLORIDE 236; 22.74; 6.74; 5.86; 2.97 G/4L; G/4L; G/4L; G/4L; G/4L
POWDER, FOR SOLUTION ORAL
Qty: 4000 ML | Refills: 0 | Status: SHIPPED | OUTPATIENT
Start: 2025-04-14

## 2025-04-15 ENCOUNTER — APPOINTMENT (OUTPATIENT)
Dept: GASTROENTEROLOGY | Facility: HOSPITAL | Age: 70
End: 2025-04-15
Payer: MEDICARE

## 2025-04-17 ENCOUNTER — APPOINTMENT (OUTPATIENT)
Dept: PHYSICAL THERAPY | Facility: CLINIC | Age: 70
End: 2025-04-17
Payer: MEDICARE

## 2025-04-21 ENCOUNTER — APPOINTMENT (OUTPATIENT)
Dept: PHYSICAL THERAPY | Facility: CLINIC | Age: 70
End: 2025-04-21
Payer: MEDICARE

## 2025-04-21 DIAGNOSIS — R42 DIZZINESS: Primary | ICD-10-CM

## 2025-04-21 DIAGNOSIS — R26.89 IMPAIRMENT OF BALANCE: ICD-10-CM

## 2025-04-24 ENCOUNTER — APPOINTMENT (OUTPATIENT)
Dept: PHYSICAL THERAPY | Facility: CLINIC | Age: 70
End: 2025-04-24
Payer: MEDICARE

## 2025-04-28 ENCOUNTER — APPOINTMENT (OUTPATIENT)
Dept: PHYSICAL THERAPY | Facility: CLINIC | Age: 70
End: 2025-04-28
Payer: MEDICARE

## 2025-04-28 DIAGNOSIS — R42 DIZZINESS: Primary | ICD-10-CM

## 2025-04-28 DIAGNOSIS — R26.89 IMPAIRMENT OF BALANCE: ICD-10-CM

## 2025-05-05 ENCOUNTER — APPOINTMENT (OUTPATIENT)
Dept: PHYSICAL THERAPY | Facility: CLINIC | Age: 70
End: 2025-05-05
Payer: MEDICARE

## 2025-05-05 DIAGNOSIS — R42 DIZZINESS: Primary | ICD-10-CM

## 2025-05-05 DIAGNOSIS — R26.89 IMPAIRMENT OF BALANCE: ICD-10-CM

## 2025-05-14 ENCOUNTER — APPOINTMENT (OUTPATIENT)
Dept: PHYSICAL THERAPY | Facility: CLINIC | Age: 70
End: 2025-05-14
Payer: MEDICARE

## 2025-05-14 DIAGNOSIS — R26.89 IMPAIRMENT OF BALANCE: ICD-10-CM

## 2025-05-14 DIAGNOSIS — R42 DIZZINESS: Primary | ICD-10-CM

## 2025-05-30 ENCOUNTER — OFFICE VISIT (OUTPATIENT)
Facility: CLINIC | Age: 70
End: 2025-05-30
Payer: MEDICARE

## 2025-05-30 VITALS
DIASTOLIC BLOOD PRESSURE: 74 MMHG | SYSTOLIC BLOOD PRESSURE: 118 MMHG | BODY MASS INDEX: 39.51 KG/M2 | WEIGHT: 216 LBS | OXYGEN SATURATION: 96 % | HEART RATE: 81 BPM

## 2025-05-30 DIAGNOSIS — E78.2 MIXED HYPERLIPIDEMIA: Primary | ICD-10-CM

## 2025-05-30 DIAGNOSIS — E66.9 OBESITY (BMI 35.0-39.9 WITHOUT COMORBIDITY): ICD-10-CM

## 2025-05-30 DIAGNOSIS — R06.00 DYSPNEA, UNSPECIFIED TYPE: ICD-10-CM

## 2025-05-30 DIAGNOSIS — I50.32 CHRONIC DIASTOLIC HEART FAILURE: ICD-10-CM

## 2025-05-30 PROCEDURE — 1159F MED LIST DOCD IN RCRD: CPT | Performed by: INTERNAL MEDICINE

## 2025-05-30 PROCEDURE — 93005 ELECTROCARDIOGRAM TRACING: CPT | Performed by: INTERNAL MEDICINE

## 2025-05-30 PROCEDURE — 1126F AMNT PAIN NOTED NONE PRSNT: CPT | Performed by: INTERNAL MEDICINE

## 2025-05-30 PROCEDURE — 99214 OFFICE O/P EST MOD 30 MIN: CPT | Mod: 25 | Performed by: INTERNAL MEDICINE

## 2025-05-30 PROCEDURE — 1036F TOBACCO NON-USER: CPT | Performed by: INTERNAL MEDICINE

## 2025-05-30 PROCEDURE — 99204 OFFICE O/P NEW MOD 45 MIN: CPT | Performed by: INTERNAL MEDICINE

## 2025-05-30 ASSESSMENT — PATIENT HEALTH QUESTIONNAIRE - PHQ9
SUM OF ALL RESPONSES TO PHQ9 QUESTIONS 1 AND 2: 0
1. LITTLE INTEREST OR PLEASURE IN DOING THINGS: NOT AT ALL
2. FEELING DOWN, DEPRESSED OR HOPELESS: NOT AT ALL

## 2025-05-30 ASSESSMENT — LIFESTYLE VARIABLES: TOTAL SCORE: 0

## 2025-05-30 ASSESSMENT — PAIN SCALES - GENERAL: PAINLEVEL_OUTOF10: 0-NO PAIN

## 2025-05-30 ASSESSMENT — ENCOUNTER SYMPTOMS
DEPRESSION: 0
LOSS OF SENSATION IN FEET: 0
OCCASIONAL FEELINGS OF UNSTEADINESS: 0

## 2025-05-30 NOTE — PROGRESS NOTES
[facility]        Subjective   Chief Complaint   Patient presents with    Cardiac eval      Pressure /heavy feeling in chest /sob with mild excretion       Referral to our office for shortness of breath/dyspnea.  He has had several months of ongoing pressure in her chest, with no exertional relationship to those persistent symptoms, in the absence of acute ischemia on her EKG.  No history of previous myocardial infarction, valvular heart disease, syncope or sustained arrhythmias or pericarditis.    She has a history of diastolic heart failure but is not on guideline directed medical therapy and a history of COPD.  X-ray in March 2025 showed no acute cardiopulmonary processes.    She sees a pulmonologist chronically but feels as if the inhalers are less effective with a decreased functional capacity with walking because of her shortness of breath.    She has a past medical history of Anxiety, Arthritis, Atrophic vaginitis (10/19/2023), Carpal tunnel syndrome (10/19/2023), Cocaine use disorder, severe, in sustained remission (01/27/2023), Depression, Fracture of proximal phalanx of toe of left foot (07/01/2024), GERD (gastroesophageal reflux disease), Heart failure (12/04/2023), Irritable bowel syndrome (06/29/2009), Rotator cuff (capsule) sprain (08/23/2011), Swelling of toe of left foot (07/01/2024), and Toe pain, left (07/01/2024).  She has a past surgical history that includes Cholecystectomy; Trigger finger release; and Carpal tunnel release.   Family medical history includes stroke in father.  Current Outpatient Medications   Medication Sig Dispense Refill    acetaminophen (Tylenol) 500 mg tablet Take 1 tablet (500 mg) by mouth every 8 hours if needed for mild pain (1 - 3).      albuterol 90 mcg/actuation inhaler Inhale 2 puffs every 4 hours if needed for wheezing. 18 g 1    buPROPion XL (Wellbutrin XL) 150 mg 24 hr tablet Take 1 tablet (150 mg) by mouth once  daily. Do not crush, chew, or split.  150mg tab      diaper,brief,adult,disposable (Depend Underwear For Women XL) misc Use as directed 4 times a day      gabapentin (Neurontin) 100 mg capsule Take 1 capsule (100 mg) by mouth 3 times a day.      hydrOXYzine HCL (Atarax) 50 mg tablet Take 1 tablet (50 mg) by mouth every 4 hours if needed.      ibuprofen 600 mg tablet Take 1 tablet (600 mg) by mouth every 6 hours if needed for mild pain (1 - 3).      mirtazapine (Remeron) 15 mg tablet Take 1 tablet (15 mg) by mouth once daily at bedtime.      multivitamin/iron/folic acid (CENTRUM COMPLETE ORAL) Take 1 tablet by mouth once daily. Indications: treatment to prevent vitamin deficiency      pantoprazole (Protonix) 40 mg EC tablet Take 1 tablet (40 mg) by mouth 2 times a day. 180 tablet 3    sertraline (Zoloft) 100 mg tablet Take 1 tablet (100 mg) by mouth twice a day.      simvastatin (Zocor) 20 mg tablet Take 1 tablet (20 mg) by mouth once daily at bedtime. 90 tablet 3    Stiolto Respimat 2.5-2.5 mcg/actuation mist inhaler Inhale 2 Inhalations once daily.      Wellbutrin  mg 24 hr tablet Take 1 tablet (300 mg) by mouth once every 24 hours.      polyethylene glycol (GoLYTELY) 236-22.74-6.74 -5.86 gram solution Drink 1/2 starting at 6 pm the night before your procedure then drink the 2nd 1/2 5 hours before procedure arrival time (Patient not taking: Reported on 5/30/2025) 4000 mL 0     No current facility-administered medications for this visit.      reports that she has quit smoking. Her smoking use included cigarettes. She has never been exposed to tobacco smoke. She has never used smokeless tobacco. She reports that she does not currently use alcohol. She reports that she does not use drugs.  Allergies:  Patient has no known allergies.    ROS: See HPI  CONSTITUTIONAL: Chills- none. Fever- none. Weight change appropriate for age.  HEENT: Headache- Negative.  Change in vision- none.  Ear pain- none. Nasal congestion-  none. Post-nasal drip-none.  Sore throat-none.  CARDIOLOGY: Chest pain- none.  Leg edema-trace.  Murmurs-soft systolic.  Palpitation- none.  RESPIRATORY: Denies any shortness of breath.  GI: Abdominal pain- none.  Change in bowel habits- none.  Constipation- none.  Diarrhea- none.  Nausea- none.  Vomiting- none.  MUSCULOSKELETAL: Joint pain- none.  Muscle aches- none.  DERMATOLOGY: Rash- none.  NEUROLOGY: Dizziness- none.   Headache- none.  PSYCHIATRY: Denies any depression or anxiety     Vitals:    05/30/25 0821   BP: 118/74   Pulse: 81   SpO2: 96%   Weight: 98 kg (216 lb)   PainSc: 0-No pain      BMI:Body mass index is 39.51 kg/m².   General Cardiology:  General Appearance: Alert, oriented and in no acute distress.  HEENT: extra ocular movements intact (EOMI), pupils equal,  round, reactive to light and accommodation (PERRLA).  Carotid Upstroke: no bruit, normal.  Jugular Venous Distention (JVD): flat.  Chest: normal.  Lungs: Clear to auscultation,   Heart Sounds: no S3 or S4, normal S1, S2, regular rate.  Murmur, Click, Gallop: soft systolic murmur.  Abdomen: no hepatomegaly, no masses felt, soft.  Extremities: no leg edema.  Peripheral pulses: 2 plus bilateral.  NEUROLOGY Cranial nerves II-XII grossly intact.     Last Labs:  CMP:  Recent Labs     03/05/25  0849 10/01/24  0726 07/13/23  0756 04/10/23  0805    134* 140 138   K 4.7 4.4 4.6 4.2   CL 98 98 99 98   CO2 25 27 29 26   ANIONGAP 12 13 17 15   BUN 7 6 9 9   CREATININE 0.50* 0.55 0.63 0.7   EGFR 100 >90  --  94   GLUCOSE 79 114* 100* 120*     Recent Labs     03/05/25  0849 10/01/24  0726 07/13/23  0756   ALBUMIN 4.6 4.5 4.9   ALKPHOS 130 118 111   ALT 71* 70* 73*   * 85* 66*   BILITOT 0.6 0.6 0.7     CBC:  Recent Labs     03/05/25  0849 07/13/23  0756 04/10/23  0805   WBC 4.8 5.7 5.9   HGB 13.1 13.0 13.5   HCT 38.3 40.0 40.4    270 250   MCV 95.0 94 93.1     COAG:   Recent Labs     03/26/19  0652 03/25/19  1615   INR 1.1 1.0  "    HEME/ENDO:  Recent Labs     03/05/25  0849 10/01/24  0726 07/13/23  0756 04/10/23  0805   TSH 3.69 3.82 2.33 2.76   HGBA1C  --  4.3  --  4.6      CARDIAC: No results for input(s): \"LDH\", \"CKMB\", \"TROPHS\", \"BNP\" in the last 17127 hours.    No lab exists for component: \"CK\", \"CKMBP\"  Recent Labs     10/01/24  0726 07/13/23  0756 04/10/23  0805 09/03/21  1148 04/09/19  0814 03/26/19  0652   CHOL 257* 265* 266* 270*   < > 259*   LDLF  --  113*  --   --   --  138*   LDLCALC 108*  --  111 129   < >  --    .1 133.9 136 110   < > 94.4   TRIG 76 90 95 154*   < > 134    < > = values in this interval not displayed.       Last Cardiology Tests:  Echo:  Echo Results:  No results found for this or any previous visit from the past 3650 days.     Cath:  Stress Test:  Stress Results:  No results found for this or any previous visit from the past 365 days.     Cardiac Imaging:    Problem List Items Addressed This Visit       Obesity (BMI 35.0-39.9 without comorbidity)    Mixed hyperlipidemia - Primary    Relevant Orders    ECG 12 lead (Clinic Performed) (Completed)    Chronic diastolic heart failure     Other Visit Diagnoses         Dyspnea, unspecified type               She would benefit from weight reduction with a Mediterranean type dietary lifestyle    I suggested echocardiography and nuclear stress testing to evaluate any cardiac abnormality related to her shortness of breath and she does have a history of diastolic heart failure but appears well compensated volume wise.  Follow-up with me after her Lexiscan nuclear stress test and echocardiography    Pt. care time is spent includes review of diagnostic tests, labs, radiographs, EKGs, old echoes, cardiac work-up and coordination of care. Assessment, impression and plans are reflected in the note above as well as the orders.    Jesus Conway, DO  [facility]  [facility]  "

## 2025-06-10 ENCOUNTER — TELEPHONE (OUTPATIENT)
Dept: PRIMARY CARE | Facility: CLINIC | Age: 70
End: 2025-06-10
Payer: MEDICARE

## 2025-06-10 DIAGNOSIS — E66.9 OBESITY (BMI 35.0-39.9 WITHOUT COMORBIDITY): Primary | ICD-10-CM

## 2025-07-01 ENCOUNTER — HOSPITAL ENCOUNTER (OUTPATIENT)
Dept: RADIOLOGY | Facility: HOSPITAL | Age: 70
Discharge: HOME | End: 2025-07-01
Payer: MEDICARE

## 2025-07-01 ENCOUNTER — APPOINTMENT (OUTPATIENT)
Dept: CARDIOLOGY | Facility: HOSPITAL | Age: 70
End: 2025-07-01
Payer: MEDICARE

## 2025-07-01 ENCOUNTER — HOSPITAL ENCOUNTER (OUTPATIENT)
Dept: CARDIOLOGY | Facility: HOSPITAL | Age: 70
Discharge: HOME | End: 2025-07-01
Payer: MEDICARE

## 2025-07-01 DIAGNOSIS — I50.32 CHRONIC DIASTOLIC HEART FAILURE: ICD-10-CM

## 2025-07-01 DIAGNOSIS — R06.00 DYSPNEA, UNSPECIFIED TYPE: ICD-10-CM

## 2025-07-01 DIAGNOSIS — E66.9 OBESITY (BMI 35.0-39.9 WITHOUT COMORBIDITY): ICD-10-CM

## 2025-07-01 DIAGNOSIS — E78.5 HYPERLIPIDEMIA, UNSPECIFIED: ICD-10-CM

## 2025-07-01 DIAGNOSIS — E78.2 MIXED HYPERLIPIDEMIA: ICD-10-CM

## 2025-07-01 PROCEDURE — 78452 HT MUSCLE IMAGE SPECT MULT: CPT

## 2025-07-01 PROCEDURE — A9502 TC99M TETROFOSMIN: HCPCS | Performed by: INTERNAL MEDICINE

## 2025-07-01 PROCEDURE — 93017 CV STRESS TEST TRACING ONLY: CPT

## 2025-07-01 PROCEDURE — 93018 CV STRESS TEST I&R ONLY: CPT | Performed by: INTERNAL MEDICINE

## 2025-07-01 PROCEDURE — 3430000001 HC RX 343 DIAGNOSTIC RADIOPHARMACEUTICALS: Performed by: INTERNAL MEDICINE

## 2025-07-01 PROCEDURE — 78452 HT MUSCLE IMAGE SPECT MULT: CPT | Performed by: INTERNAL MEDICINE

## 2025-07-01 PROCEDURE — 2500000004 HC RX 250 GENERAL PHARMACY W/ HCPCS (ALT 636 FOR OP/ED): Performed by: INTERNAL MEDICINE

## 2025-07-01 PROCEDURE — 93016 CV STRESS TEST SUPVJ ONLY: CPT | Performed by: INTERNAL MEDICINE

## 2025-07-01 RX ORDER — REGADENOSON 0.08 MG/ML
0.4 INJECTION, SOLUTION INTRAVENOUS ONCE
Status: COMPLETED | OUTPATIENT
Start: 2025-07-01 | End: 2025-07-01

## 2025-07-01 RX ADMIN — TETROFOSMIN 10.4 MILLICURIE: 0.23 INJECTION, POWDER, LYOPHILIZED, FOR SOLUTION INTRAVENOUS at 08:20

## 2025-07-01 RX ADMIN — TETROFOSMIN 34.4 MILLICURIE: 0.23 INJECTION, POWDER, LYOPHILIZED, FOR SOLUTION INTRAVENOUS at 09:23

## 2025-07-01 RX ADMIN — REGADENOSON 0.4 MG: 0.08 INJECTION, SOLUTION INTRAVENOUS at 09:23

## 2025-07-08 ENCOUNTER — APPOINTMENT (OUTPATIENT)
Dept: PHYSICAL THERAPY | Facility: CLINIC | Age: 70
End: 2025-07-08
Payer: MEDICARE

## 2025-07-08 ENCOUNTER — APPOINTMENT (OUTPATIENT)
Dept: CARDIOLOGY | Facility: HOSPITAL | Age: 70
End: 2025-07-08
Payer: MEDICARE

## 2025-07-08 DIAGNOSIS — R26.89 IMPAIRMENT OF BALANCE: ICD-10-CM

## 2025-07-08 DIAGNOSIS — R42 DIZZINESS: Primary | ICD-10-CM

## 2025-07-14 ENCOUNTER — TELEPHONE (OUTPATIENT)
Dept: PRIMARY CARE | Facility: CLINIC | Age: 70
End: 2025-07-14
Payer: MEDICARE

## 2025-07-14 NOTE — TELEPHONE ENCOUNTER
States she saw Dr Conway and did her stress test - just wants your opinion on the results. She is also going to do an ECHO - FYI.

## 2025-07-15 ENCOUNTER — TELEPHONE (OUTPATIENT)
Facility: CLINIC | Age: 70
End: 2025-07-15
Payer: MEDICARE

## 2025-07-15 NOTE — TELEPHONE ENCOUNTER
Pt called in to get stress test results, reviewed test results with her, pt said she was not getting an echo at this time because she could not afford it, she has a follow up with Dr Conway in august

## 2025-08-04 ENCOUNTER — APPOINTMENT (OUTPATIENT)
Dept: UROLOGY | Facility: CLINIC | Age: 70
End: 2025-08-04
Payer: MEDICARE

## 2025-08-06 ENCOUNTER — APPOINTMENT (OUTPATIENT)
Facility: CLINIC | Age: 70
End: 2025-08-06
Payer: MEDICARE

## 2025-08-07 ENCOUNTER — DOCUMENTATION (OUTPATIENT)
Dept: PHYSICAL THERAPY | Facility: CLINIC | Age: 70
End: 2025-08-07
Payer: MEDICARE

## 2025-08-07 DIAGNOSIS — R42 DIZZINESS: Primary | ICD-10-CM

## 2025-08-07 DIAGNOSIS — R26.89 IMPAIRMENT OF BALANCE: ICD-10-CM

## 2025-08-07 NOTE — PROGRESS NOTES
Physical Therapy  Discharge Summary    Name: Johanny Rosales  MRN: [id number]  : 1955  Date of DC: 23  Date of initial evaluation: 3/4/25    Functional Status at Discharge: Unable to assess due to non-compliance    Rehab Discharge Reason: Failed to schedule and/or keep follow-up appointment(s)    Discharge Plan: Unable to provide due to non-compliance    Was this episode resolved in Epic: Yes

## 2025-08-14 ENCOUNTER — TELEPHONE (OUTPATIENT)
Dept: PRIMARY CARE | Facility: CLINIC | Age: 70
End: 2025-08-14
Payer: MEDICARE

## 2025-08-14 DIAGNOSIS — R26.89 IMPAIRMENT OF BALANCE: Primary | ICD-10-CM
